# Patient Record
Sex: FEMALE | Race: WHITE | Employment: UNEMPLOYED | URBAN - METROPOLITAN AREA
[De-identification: names, ages, dates, MRNs, and addresses within clinical notes are randomized per-mention and may not be internally consistent; named-entity substitution may affect disease eponyms.]

---

## 2017-03-14 ENCOUNTER — TELEPHONE (OUTPATIENT)
Dept: OBGYN CLINIC | Facility: CLINIC | Age: 32
End: 2017-03-14

## 2017-03-14 NOTE — TELEPHONE ENCOUNTER
Authorization for Release of health-related information received via fax and placed on [employer] clipboard for processing.

## 2017-10-04 ENCOUNTER — TELEPHONE (OUTPATIENT)
Dept: OBGYN CLINIC | Facility: CLINIC | Age: 32
End: 2017-10-04

## 2017-10-04 NOTE — TELEPHONE ENCOUNTER
Reviewed message with PHOENIX in office who stated that pt needs to contact office if has no periods for 3 months AFTER she stops breastfeeding. Pt informed and verbalized understanding.  Pt informed that PHOENIX stated he may consider doing some labs if does not g

## 2017-10-04 NOTE — TELEPHONE ENCOUNTER
Pt calling to report that she delivered with our practice in September 2016 and is just now trying to wean her baby from breastfeeding.  Pt stated that when baby was around 11 months, she started weaning breastfeeding but states that she continues to breast

## 2017-10-09 ENCOUNTER — TELEPHONE (OUTPATIENT)
Dept: OBGYN CLINIC | Facility: CLINIC | Age: 32
End: 2017-10-09

## 2017-10-09 DIAGNOSIS — Z32.00 PREGNANCY EXAMINATION OR TEST, PREGNANCY UNCONFIRMED: Primary | ICD-10-CM

## 2017-10-09 NOTE — TELEPHONE ENCOUNTER
Pt calling to report that she called last week (see 10/4 comm) with complaints that she was currently breastfeeding but also using ovulation sticks because she wanted to see when her and her  could try to have another baby. Pt stated that she had several ovulation sticks that showed she was ovulating for about 8 days in a row. Pt stated she read online that if ovulation sticks show multiple days with a positive result it could be a sign of pregnancy. Pt stated that she took a HPT and it was positive. Pt stated that since early September she started weaning with breastfeeding and only  her infant \"for about 5 minutes before bed\". Pt stated that about 10 days she \"stopped breastfeeding completely\". Pt has not had any periods since her last delivery (9/7/16) and is unsure how far along she is. Pt informed that message will be sent to CAP (on call) to see if we can do blood work or [address]. Pt verbalized understanding.

## 2017-10-10 ENCOUNTER — LAB ENCOUNTER (OUTPATIENT)
Dept: LAB | Facility: HOSPITAL | Age: 32
End: 2017-10-10
Attending: OBSTETRICS & GYNECOLOGY
Payer: COMMERCIAL

## 2017-10-10 ENCOUNTER — TELEPHONE (OUTPATIENT)
Dept: OBGYN CLINIC | Facility: CLINIC | Age: 32
End: 2017-10-10

## 2017-10-10 DIAGNOSIS — Z32.00 PREGNANCY EXAMINATION OR TEST, PREGNANCY UNCONFIRMED: Primary | ICD-10-CM

## 2017-10-10 DIAGNOSIS — Z34.90 PREGNANCY, UNSPECIFIED GESTATIONAL AGE: Primary | ICD-10-CM

## 2017-10-10 PROCEDURE — 36415 COLL VENOUS BLD VENIPUNCTURE: CPT

## 2017-10-10 PROCEDURE — 84702 CHORIONIC GONADOTROPIN TEST: CPT

## 2017-10-10 NOTE — TELEPHONE ENCOUNTER
Spoke with CAP (on call) and verbal order given for pt to have quant drawn and if 2,500 or above then can do US to check for dating. Pt informed and verbalized understanding. Pt will go for quant tomorrow and call for results.

## 2017-10-10 NOTE — TELEPHONE ENCOUNTER
Pt given quant result and advised to schedule US for dates. Pt given the number for central scheduling. Pt advised to call back for results of US and to schedule OBN PC appt. Pt verbalizes understanding.

## 2017-10-17 ENCOUNTER — HOSPITAL ENCOUNTER (OUTPATIENT)
Dept: ULTRASOUND IMAGING | Facility: HOSPITAL | Age: 32
Discharge: HOME | End: 2017-10-17
Attending: OBSTETRICS & GYNECOLOGY
Payer: COMMERCIAL

## 2017-10-17 DIAGNOSIS — Z34.90 PREGNANCY, UNSPECIFIED GESTATIONAL AGE: ICD-10-CM

## 2017-10-17 PROCEDURE — 76801 OB US < 14 WKS SINGLE FETUS: CPT | Performed by: OBSTETRICS & GYNECOLOGY

## 2017-10-17 PROCEDURE — 76817 TRANSVAGINAL US OBSTETRIC: CPT | Performed by: OBSTETRICS & GYNECOLOGY

## 2017-10-18 ENCOUNTER — TELEPHONE (OUTPATIENT)
Dept: OBGYN CLINIC | Facility: CLINIC | Age: 32
End: 2017-10-18

## 2017-10-18 NOTE — TELEPHONE ENCOUNTER
Once pt decides which practice she is going to & starts care, recommend recheck FHT in office visit after OB nurse intake history done with that practice

## 2017-10-18 NOTE — TELEPHONE ENCOUNTER
Can schedule same day appt for Bedside US in office with a doctor after nurse visit. This will not be her OB MD visit however. She had a [address] that is common. Needs to notify US of bleeding.

## 2017-10-18 NOTE — TELEPHONE ENCOUNTER
Pt states that she has decided to stay with our group. Pt scheduled an OBN PC appt. Pt had been informed of [facility]'s recs below. Pt wants to know since she is doing an OBN PC appt should she come in and see OB MD appt for an OB U/S before her OBN PC.   Pt w

## 2017-10-18 NOTE — TELEPHONE ENCOUNTER
Pt has OBN PC 11/2 and scheduled a bedside US with Anant Luke the following day 11/3. Pt to call with VB or spotting. Pt verbalized understanding.

## 2017-10-18 NOTE — TELEPHONE ENCOUNTER
Pt informed that OB US showed that pt is about 5w5d as of yesterday with an JESSICA of 6/14/18. Pt verbalized understanding. Pt delivered with our practice in 2016, but has been breastfeeding and was unsure of dating since she has not had a period.  Pt informe

## 2017-10-19 ENCOUNTER — TELEPHONE (OUTPATIENT)
Dept: OBGYN CLINIC | Facility: CLINIC | Age: 32
End: 2017-10-19

## 2017-10-19 NOTE — TELEPHONE ENCOUNTER
Pt has a lot of questions about her US--mostly subchorionic hemorrhage that was noted. Pt informed that subchorionic hemorrhage usually resolves on its own sometime during the 1st/2nd trimester and is not uncommon.  Pt asking if its okay to wait 2 more week

## 2017-10-19 NOTE — TELEPHONE ENCOUNTER
[facility] does not require repeat US for resolution. She will be sent for US if concerns for bleeding occur.

## 2017-10-19 NOTE — TELEPHONE ENCOUNTER
Per pt would like to speak to a nurse. Wants more information on the hemorrhaging that was found on the US she had this week. Wants to know if she needs to be seen sooner than the 2 weeks that she was told. And if the hemorrhaging was her or the baby.  Want

## 2017-11-02 ENCOUNTER — NURSE ONLY (OUTPATIENT)
Dept: OBGYN CLINIC | Facility: CLINIC | Age: 32
End: 2017-11-02

## 2017-11-02 VITALS — HEIGHT: 66.5 IN | BODY MASS INDEX: 20.96 KG/M2 | WEIGHT: 132 LBS

## 2017-11-02 DIAGNOSIS — Z3A.08 8 WEEKS GESTATION OF PREGNANCY: Primary | ICD-10-CM

## 2017-11-02 RX ORDER — DOCUSATE SODIUM 100 MG/1
100 CAPSULE, LIQUID FILLED ORAL 2 TIMES DAILY
COMMUNITY
End: 2021-07-20

## 2017-11-02 NOTE — PROGRESS NOTES
Pt seen for OBN PC  appt today with no complaints. Normal PN labs ordered. Pt advised all labs must be completed and resulted prior to MD appt. Pt  Transferred to [facility] to make an appt with NPN appt with MD.       Pt has a hcg in the computer.   Pt has an Muscular Dystrophy No    Neural tube defects No    Sickle Cell Disease or trait No    Devon-Sachs Disease No    Thalassemia No    Other inherited genetic or chromosomal disorders No    Patient or baby's father had a child with birth defects not listed abov

## 2017-11-03 ENCOUNTER — LAB ENCOUNTER (OUTPATIENT)
Dept: LAB | Facility: HOSPITAL | Age: 32
End: 2017-11-03
Attending: OBSTETRICS & GYNECOLOGY
Payer: COMMERCIAL

## 2017-11-03 ENCOUNTER — TELEPHONE (OUTPATIENT)
Dept: OBGYN CLINIC | Facility: CLINIC | Age: 32
End: 2017-11-03

## 2017-11-03 DIAGNOSIS — Z3A.08 8 WEEKS GESTATION OF PREGNANCY: Primary | ICD-10-CM

## 2017-11-03 PROCEDURE — 82950 GLUCOSE TEST: CPT

## 2017-11-03 PROCEDURE — 85025 COMPLETE CBC W/AUTO DIFF WBC: CPT

## 2017-11-03 PROCEDURE — 86780 TREPONEMA PALLIDUM: CPT

## 2017-11-03 PROCEDURE — 36415 COLL VENOUS BLD VENIPUNCTURE: CPT

## 2017-11-03 PROCEDURE — 86901 BLOOD TYPING SEROLOGIC RH(D): CPT

## 2017-11-03 PROCEDURE — 87389 HIV-1 AG W/HIV-1&-2 AB AG IA: CPT

## 2017-11-03 PROCEDURE — 87340 HEPATITIS B SURFACE AG IA: CPT

## 2017-11-03 PROCEDURE — 86850 RBC ANTIBODY SCREEN: CPT

## 2017-11-03 PROCEDURE — 87086 URINE CULTURE/COLONY COUNT: CPT

## 2017-11-03 PROCEDURE — 86900 BLOOD TYPING SEROLOGIC ABO: CPT

## 2017-11-03 PROCEDURE — 86762 RUBELLA ANTIBODY: CPT

## 2017-11-03 NOTE — TELEPHONE ENCOUNTER
Pt [facility] after seeing a [address] on her OB US a few weeks ago. Pt states she has not had an VB or pain, feels well besides being nauseous.  Informed pt she does not need to come in today since she had an US showing a viabl

## 2017-11-04 NOTE — TELEPHONE ENCOUNTER
Informed pt of normal PN lab results. Also informed her HIV, Hep B and urine cx are in process and should be available by [facility] afternoon. Informed pt she can call us back for these results or the doctor will review them during her first PN appt on Friday.  MANJULA

## 2017-11-10 ENCOUNTER — INITIAL PRENATAL (OUTPATIENT)
Dept: OBGYN CLINIC | Facility: CLINIC | Age: 32
End: 2017-11-10

## 2017-11-10 VITALS
HEART RATE: 89 BPM | DIASTOLIC BLOOD PRESSURE: 78 MMHG | WEIGHT: 133 LBS | BODY MASS INDEX: 21 KG/M2 | SYSTOLIC BLOOD PRESSURE: 119 MMHG

## 2017-11-10 DIAGNOSIS — Z34.91 ENCOUNTER FOR SUPERVISION OF NORMAL PREGNANCY IN FIRST TRIMESTER, UNSPECIFIED GRAVIDITY: Primary | ICD-10-CM

## 2017-11-13 PROBLEM — N94.10 DYSPAREUNIA IN FEMALE: Status: ACTIVE | Noted: 2017-11-13

## 2017-11-13 PROBLEM — G89.29 CHRONIC PERINEAL PAIN IN FEMALE: Status: ACTIVE | Noted: 2017-11-13

## 2017-11-13 PROBLEM — O09.529 ANTEPARTUM MULTIGRAVIDA OF ADVANCED MATERNAL AGE (HCC): Status: ACTIVE | Noted: 2017-11-13

## 2017-11-13 PROBLEM — R10.2 CHRONIC PERINEAL PAIN IN FEMALE: Status: ACTIVE | Noted: 2017-11-13

## 2017-11-13 PROBLEM — O09.529 ANTEPARTUM MULTIGRAVIDA OF ADVANCED MATERNAL AGE: Status: ACTIVE | Noted: 2017-11-13

## 2017-11-13 NOTE — PROGRESS NOTES
Daily nausea w/o emesis. Discussed AMA and she declined genetics / Level 2. Only complaint is persistent perineal pain post repair of partial 3rd degree perineal laceration.  We will refer to pelvic floor PT eval and treatment in anticipation of vaginal bi

## 2017-11-17 ENCOUNTER — OFFICE VISIT (OUTPATIENT)
Dept: OBGYN CLINIC | Facility: CLINIC | Age: 32
End: 2017-11-17

## 2017-11-17 DIAGNOSIS — IMO0002 MEET AND GREET FOR EXPECTANT MOTHER: Primary | ICD-10-CM

## 2017-11-17 NOTE — PROGRESS NOTES
Here for meet & greet. Currently with 2nd floor & had her first baby with them, had 3rd degree, long labor with epidural. CNM care/philosophies discussed. Appropriate for CNM care. If desires to make prenatal visit with CNM as have records in system.

## 2017-12-08 ENCOUNTER — ROUTINE PRENATAL (OUTPATIENT)
Dept: OBGYN CLINIC | Facility: CLINIC | Age: 32
End: 2017-12-08

## 2017-12-08 VITALS
HEART RATE: 81 BPM | WEIGHT: 137.63 LBS | SYSTOLIC BLOOD PRESSURE: 109 MMHG | DIASTOLIC BLOOD PRESSURE: 73 MMHG | BODY MASS INDEX: 22 KG/M2

## 2017-12-08 DIAGNOSIS — Z34.81 ENCOUNTER FOR SUPERVISION OF OTHER NORMAL PREGNANCY IN FIRST TRIMESTER: Primary | ICD-10-CM

## 2018-01-05 ENCOUNTER — ROUTINE PRENATAL (OUTPATIENT)
Dept: OBGYN CLINIC | Facility: CLINIC | Age: 33
End: 2018-01-05

## 2018-01-05 VITALS
HEART RATE: 76 BPM | SYSTOLIC BLOOD PRESSURE: 99 MMHG | BODY MASS INDEX: 23 KG/M2 | WEIGHT: 142 LBS | DIASTOLIC BLOOD PRESSURE: 62 MMHG

## 2018-01-05 DIAGNOSIS — Z34.92 ENCOUNTER FOR SUPERVISION OF NORMAL PREGNANCY IN SECOND TRIMESTER, UNSPECIFIED GRAVIDITY: Primary | ICD-10-CM

## 2018-01-05 LAB
MULTISTIX LOT#: NORMAL NUMERIC
PH, URINE: 5 (ref 4.5–8)
SPECIFIC GRAVITY: 1.01 (ref 1–1.03)

## 2018-02-01 ENCOUNTER — HOSPITAL ENCOUNTER (OUTPATIENT)
Dept: ULTRASOUND IMAGING | Facility: HOSPITAL | Age: 33
Discharge: HOME | End: 2018-02-01
Attending: OBSTETRICS & GYNECOLOGY
Payer: COMMERCIAL

## 2018-02-01 DIAGNOSIS — Z34.92 ENCOUNTER FOR SUPERVISION OF NORMAL PREGNANCY IN SECOND TRIMESTER, UNSPECIFIED GRAVIDITY: ICD-10-CM

## 2018-02-01 PROCEDURE — 76805 OB US >/= 14 WKS SNGL FETUS: CPT | Performed by: OBSTETRICS & GYNECOLOGY

## 2018-02-04 ENCOUNTER — TELEPHONE (OUTPATIENT)
Dept: OBGYN CLINIC | Facility: CLINIC | Age: 33
End: 2018-02-04

## 2018-02-04 NOTE — TELEPHONE ENCOUNTER
21 weeks with ongoing painful hemorrhoid. Hurts to sit. Trying epson salt baths and prep H w/o relief. Feels a grape size cystic mass on rectum. Discussed likely thrombosed hemorrhoid. Recommend rest, continue hot baths, prep H, and stool softners.   Claryce Forth

## 2018-02-05 ENCOUNTER — ROUTINE PRENATAL (OUTPATIENT)
Dept: OBGYN CLINIC | Facility: CLINIC | Age: 33
End: 2018-02-05

## 2018-02-05 VITALS
WEIGHT: 150.38 LBS | SYSTOLIC BLOOD PRESSURE: 114 MMHG | HEART RATE: 85 BPM | DIASTOLIC BLOOD PRESSURE: 68 MMHG | BODY MASS INDEX: 24 KG/M2

## 2018-02-05 DIAGNOSIS — Z34.92 ENCOUNTER FOR SUPERVISION OF NORMAL PREGNANCY IN SECOND TRIMESTER, UNSPECIFIED GRAVIDITY: Primary | ICD-10-CM

## 2018-02-05 PROCEDURE — 99213 OFFICE O/P EST LOW 20 MIN: CPT | Performed by: OBSTETRICS & GYNECOLOGY

## 2018-02-06 ENCOUNTER — OFFICE VISIT (OUTPATIENT)
Dept: SURGERY | Facility: CLINIC | Age: 33
End: 2018-02-06

## 2018-02-06 ENCOUNTER — TELEPHONE (OUTPATIENT)
Dept: PEDIATRICS CLINIC | Facility: CLINIC | Age: 33
End: 2018-02-06

## 2018-02-06 VITALS
HEIGHT: 66 IN | HEART RATE: 62 BPM | SYSTOLIC BLOOD PRESSURE: 110 MMHG | WEIGHT: 150 LBS | DIASTOLIC BLOOD PRESSURE: 72 MMHG | BODY MASS INDEX: 24.11 KG/M2

## 2018-02-06 DIAGNOSIS — K64.5 THROMBOSED EXTERNAL HEMORRHOID: Primary | ICD-10-CM

## 2018-02-06 DIAGNOSIS — K64.8 PROLAPSED INTERNAL HEMORRHOIDS: ICD-10-CM

## 2018-02-06 PROBLEM — O22.42 HEMORRHOIDS DURING PREGNANCY IN SECOND TRIMESTER (HCC): Status: ACTIVE | Noted: 2018-02-06

## 2018-02-06 PROBLEM — O22.42 HEMORRHOIDS DURING PREGNANCY IN SECOND TRIMESTER: Status: ACTIVE | Noted: 2018-02-06

## 2018-02-06 PROCEDURE — 99244 OFF/OP CNSLTJ NEW/EST MOD 40: CPT | Performed by: SURGERY

## 2018-02-06 PROCEDURE — 99212 OFFICE O/P EST SF 10 MIN: CPT | Performed by: SURGERY

## 2018-02-06 PROCEDURE — 46083 INC THROMBOSED HROID XTRNL: CPT | Performed by: SURGERY

## 2018-02-06 NOTE — TELEPHONE ENCOUNTER
Talked to Dr. Shelia Solano nurse Mariya Fall. She stated that doctor could see pt today at 3:00 PM.  Gave pt directions to see doctor. Sent to PHOENIX as a FYI, that ptis going to see doctor.

## 2018-02-06 NOTE — TELEPHONE ENCOUNTER
PT HAS MIGUEL SEEN  YESTERDAY AND SAID HE TO TAKE STEROIDS AND SEE IF THAT WORKS PT CANT WALK ITS GETTING WORSE ASKING TO SPEAK TO NURSE TO SEE WHERE TO GO TO GET TI REMOVED

## 2018-02-06 NOTE — TELEPHONE ENCOUNTER
21w5d.  Pt's states that her hemorrhoids are worse. She rates her pain 10/10. She states that PHOENIX gave her a rx for [facility]. and she states it is not helping. Denies bleeding. She states with  her external hemorrhoids, it makes it hard to stand up and move. Pt has an appt with Gayle Lee on 2/9/18, but feels that she can not wait until that appt.  Paged PHOENIX.

## 2018-02-06 NOTE — TELEPHONE ENCOUNTER
Discussed with PHOENIX the below. He stated that pt needs to see general surgeon today if possible or go to the ED.       Called General Surgeon' office and they stated that pt could

## 2018-02-06 NOTE — TELEPHONE ENCOUNTER
Pt rt call, asking if she needs ok from physicians to make an appt for hemorrhoid to be removed? States steroids are not helping, cannot stand, walk. pls adv.

## 2018-02-07 NOTE — PROGRESS NOTES
History and Physical      Bernie Born is a 26year old female. HPI   Patient presents with:  Hemorrhoids: Patient referred by OB Dr. Flo Healy for hemorrhoids. 21 weeks pregnant.  Seen by Dr. Flo Healy yesterday 02/05 and was given Analpram HC with no re Marketing                               maternity leave    Social History Main Topics    Smoking status: Never Smoker                                                                Smokeless tobacco: Never Used or mass, nl tone.        DIAGNOSTICS      ASSESSMENT/PLAN   Assessment   Thrombosed external hemorrhoid  (primary encounter diagnosis)  Prolapsed internal hemorrhoids    27 y/o woman with probably subacute hemorrhoid disease - prolapsing internal polypoid-l

## 2018-02-07 NOTE — PROGRESS NOTES
Problem visit: Worsening hemorrhoid swelling and pain. Used OTC cream w/o relief. No blood. Exam shows single 1 cm inflamed hemorrhoid w/o thrombosis at 12-1:00 position. Begin Analpram HC 2.5% and guidance given. Discussed general surgeon if no help.

## 2018-02-09 ENCOUNTER — ROUTINE PRENATAL (OUTPATIENT)
Dept: OBGYN CLINIC | Facility: CLINIC | Age: 33
End: 2018-02-09

## 2018-02-09 VITALS
BODY MASS INDEX: 24 KG/M2 | SYSTOLIC BLOOD PRESSURE: 113 MMHG | HEART RATE: 86 BPM | WEIGHT: 149 LBS | DIASTOLIC BLOOD PRESSURE: 78 MMHG

## 2018-02-09 DIAGNOSIS — Z34.82 ENCOUNTER FOR SUPERVISION OF OTHER NORMAL PREGNANCY IN SECOND TRIMESTER: Primary | ICD-10-CM

## 2018-02-09 LAB
APPEARANCE: CLEAR
MULTISTIX LOT#: NORMAL NUMERIC

## 2018-02-09 PROCEDURE — 81002 URINALYSIS NONAUTO W/O SCOPE: CPT | Performed by: OBSTETRICS & GYNECOLOGY

## 2018-02-09 NOTE — PROGRESS NOTES
Hemorrhoid is improving and no longer in pain. Pt concerned that she is not taking PNV at this time to help with constipation. Reviewed that it is ok for next few weeks to help with constipation and hemorrhoid pain.   RTC 4 wks

## 2018-03-08 ENCOUNTER — ROUTINE PRENATAL (OUTPATIENT)
Dept: OBGYN CLINIC | Facility: CLINIC | Age: 33
End: 2018-03-08

## 2018-03-08 VITALS
HEART RATE: 76 BPM | BODY MASS INDEX: 25 KG/M2 | DIASTOLIC BLOOD PRESSURE: 66 MMHG | SYSTOLIC BLOOD PRESSURE: 104 MMHG | WEIGHT: 156.19 LBS

## 2018-03-08 DIAGNOSIS — Z34.92 ENCOUNTER FOR SUPERVISION OF NORMAL PREGNANCY IN SECOND TRIMESTER, UNSPECIFIED GRAVIDITY: Primary | ICD-10-CM

## 2018-03-08 LAB
APPEARANCE: CLEAR
MULTISTIX LOT#: NORMAL NUMERIC
PH, URINE: 7.5 (ref 4.5–8)
SPECIFIC GRAVITY: 1 (ref 1–1.03)
URINE-COLOR: YELLOW
UROBILINOGEN,SEMI-QN: 0.2 MG/DL (ref 0–1.9)

## 2018-03-08 PROCEDURE — 81002 URINALYSIS NONAUTO W/O SCOPE: CPT | Performed by: OBSTETRICS & GYNECOLOGY

## 2018-03-21 ENCOUNTER — APPOINTMENT (OUTPATIENT)
Dept: LAB | Age: 33
End: 2018-03-21
Attending: OBSTETRICS & GYNECOLOGY
Payer: COMMERCIAL

## 2018-03-21 DIAGNOSIS — Z34.92 ENCOUNTER FOR SUPERVISION OF NORMAL PREGNANCY IN SECOND TRIMESTER, UNSPECIFIED GRAVIDITY: ICD-10-CM

## 2018-03-21 LAB
ERYTHROCYTE [DISTWIDTH] IN BLOOD BY AUTOMATED COUNT: 13.1 % (ref 11–15)
GLUCOSE 1H P 50 G GLC PO SERPL-MCNC: 124 MG/DL
HCT VFR BLD AUTO: 35.7 % (ref 35–48)
HGB BLD-MCNC: 12.5 G/DL (ref 12–16)
MCH RBC QN AUTO: 31.4 PG (ref 27–32)
MCHC RBC AUTO-ENTMCNC: 34.9 G/DL (ref 32–37)
MCV RBC AUTO: 89.9 FL (ref 80–100)
PLATELET # BLD AUTO: 235 K/UL (ref 140–400)
PMV BLD AUTO: 8.9 FL (ref 7.4–10.3)
RBC # BLD AUTO: 3.96 M/UL (ref 3.7–5.4)
WBC # BLD AUTO: 9 K/UL (ref 4–11)

## 2018-03-21 PROCEDURE — 82950 GLUCOSE TEST: CPT

## 2018-03-21 PROCEDURE — 85027 COMPLETE CBC AUTOMATED: CPT

## 2018-03-21 PROCEDURE — 36415 COLL VENOUS BLD VENIPUNCTURE: CPT

## 2018-03-26 ENCOUNTER — ROUTINE PRENATAL (OUTPATIENT)
Dept: OBGYN CLINIC | Facility: CLINIC | Age: 33
End: 2018-03-26

## 2018-03-26 VITALS
WEIGHT: 159.81 LBS | DIASTOLIC BLOOD PRESSURE: 71 MMHG | BODY MASS INDEX: 26 KG/M2 | HEART RATE: 87 BPM | SYSTOLIC BLOOD PRESSURE: 109 MMHG

## 2018-03-26 DIAGNOSIS — Z34.83 ENCOUNTER FOR SUPERVISION OF OTHER NORMAL PREGNANCY IN THIRD TRIMESTER: Primary | ICD-10-CM

## 2018-03-26 LAB
APPEARANCE: CLEAR
MULTISTIX LOT#: NORMAL NUMERIC
PH, URINE: 5.5 (ref 4.5–8)
SPECIFIC GRAVITY: 1.01 (ref 1–1.03)
URINE-COLOR: YELLOW
UROBILINOGEN,SEMI-QN: 0.2 MG/DL (ref 0–1.9)

## 2018-03-26 PROCEDURE — 81002 URINALYSIS NONAUTO W/O SCOPE: CPT | Performed by: OBSTETRICS & GYNECOLOGY

## 2018-03-26 NOTE — PROGRESS NOTES
No issues. Has stopped PNV due to hemorrhoids and constiaption. Eating well balanced diet however. RTC 2 wks.

## 2018-04-06 ENCOUNTER — TELEPHONE (OUTPATIENT)
Dept: OBGYN CLINIC | Facility: CLINIC | Age: 33
End: 2018-04-06

## 2018-04-06 ENCOUNTER — ROUTINE PRENATAL (OUTPATIENT)
Dept: OBGYN CLINIC | Facility: CLINIC | Age: 33
End: 2018-04-06

## 2018-04-06 VITALS
DIASTOLIC BLOOD PRESSURE: 73 MMHG | WEIGHT: 160 LBS | HEART RATE: 79 BPM | SYSTOLIC BLOOD PRESSURE: 118 MMHG | BODY MASS INDEX: 26 KG/M2

## 2018-04-06 DIAGNOSIS — O09.523 ELDERLY MULTIGRAVIDA IN THIRD TRIMESTER: ICD-10-CM

## 2018-04-06 DIAGNOSIS — Z34.93 ENCOUNTER FOR SUPERVISION OF NORMAL PREGNANCY IN THIRD TRIMESTER, UNSPECIFIED GRAVIDITY: Primary | ICD-10-CM

## 2018-04-06 PROCEDURE — 81002 URINALYSIS NONAUTO W/O SCOPE: CPT | Performed by: OBSTETRICS & GYNECOLOGY

## 2018-04-06 NOTE — TELEPHONE ENCOUNTER
Pt was seen by PHOENIX today for PN appt. Message to PHOENIX--does pt need US ordered to be completed at 32 weeks?

## 2018-04-06 NOTE — TELEPHONE ENCOUNTER
LIONEL, CALLING FROM [address] [place] [address] OUT PT SCHEDULING / REQUESTING AN ORDER FOR PREGNANCY US / PLS ADV

## 2018-04-07 ENCOUNTER — TELEPHONE (OUTPATIENT)
Dept: OBGYN CLINIC | Facility: CLINIC | Age: 33
End: 2018-04-07

## 2018-04-17 ENCOUNTER — TELEPHONE (OUTPATIENT)
Dept: OBGYN CLINIC | Facility: CLINIC | Age: 33
End: 2018-04-17

## 2018-04-17 ENCOUNTER — HOSPITAL ENCOUNTER (OUTPATIENT)
Dept: ULTRASOUND IMAGING | Facility: HOSPITAL | Age: 33
Discharge: HOME | End: 2018-04-17
Attending: OBSTETRICS & GYNECOLOGY
Payer: COMMERCIAL

## 2018-04-17 DIAGNOSIS — O09.523 ELDERLY MULTIGRAVIDA IN THIRD TRIMESTER: ICD-10-CM

## 2018-04-17 PROCEDURE — 76816 OB US FOLLOW-UP PER FETUS: CPT | Performed by: OBSTETRICS & GYNECOLOGY

## 2018-04-18 NOTE — TELEPHONE ENCOUNTER
Paged on call by Paola William [phone] with report suggesting funneling of cervix at 31+ weeks. Closed length is normal at 4.8 cm but radiology impression is total length of 8.3 cm with significant widening above the closed segment. I spoke with [facility] over phone.  No regu

## 2018-04-20 ENCOUNTER — ROUTINE PRENATAL (OUTPATIENT)
Dept: OBGYN CLINIC | Facility: CLINIC | Age: 33
End: 2018-04-20

## 2018-04-20 VITALS
WEIGHT: 164 LBS | SYSTOLIC BLOOD PRESSURE: 110 MMHG | BODY MASS INDEX: 26 KG/M2 | DIASTOLIC BLOOD PRESSURE: 76 MMHG | HEART RATE: 80 BPM

## 2018-04-20 DIAGNOSIS — Z34.83 ENCOUNTER FOR SUPERVISION OF OTHER NORMAL PREGNANCY IN THIRD TRIMESTER: Primary | ICD-10-CM

## 2018-04-20 PROCEDURE — 81002 URINALYSIS NONAUTO W/O SCOPE: CPT | Performed by: OBSTETRICS & GYNECOLOGY

## 2018-04-29 PROBLEM — Z34.90 PRENATAL CARE, ANTEPARTUM: Status: ACTIVE | Noted: 2018-04-29

## 2018-04-29 PROBLEM — Z34.90 PRENATAL CARE, ANTEPARTUM (HCC): Status: ACTIVE | Noted: 2018-04-29

## 2018-04-30 NOTE — PROGRESS NOTES
47%. No S/S of PTL. We discussed group opinion on US result showing funneling. No further f/u. PTL instructions only.

## 2018-05-04 ENCOUNTER — ROUTINE PRENATAL (OUTPATIENT)
Dept: OBGYN CLINIC | Facility: CLINIC | Age: 33
End: 2018-05-04

## 2018-05-04 ENCOUNTER — APPOINTMENT (OUTPATIENT)
Dept: LAB | Facility: HOSPITAL | Age: 33
End: 2018-05-04
Attending: OBSTETRICS & GYNECOLOGY
Payer: COMMERCIAL

## 2018-05-04 VITALS
BODY MASS INDEX: 27 KG/M2 | WEIGHT: 165 LBS | HEART RATE: 82 BPM | DIASTOLIC BLOOD PRESSURE: 69 MMHG | SYSTOLIC BLOOD PRESSURE: 116 MMHG

## 2018-05-04 DIAGNOSIS — Z34.93 ENCOUNTER FOR SUPERVISION OF NORMAL PREGNANCY IN THIRD TRIMESTER, UNSPECIFIED GRAVIDITY: Primary | ICD-10-CM

## 2018-05-04 DIAGNOSIS — O09.523 AMA (ADVANCED MATERNAL AGE) MULTIGRAVIDA 35+, THIRD TRIMESTER: ICD-10-CM

## 2018-05-04 DIAGNOSIS — Z34.83 ENCOUNTER FOR SUPERVISION OF OTHER NORMAL PREGNANCY IN THIRD TRIMESTER: ICD-10-CM

## 2018-05-04 PROCEDURE — 87389 HIV-1 AG W/HIV-1&-2 AB AG IA: CPT

## 2018-05-04 PROCEDURE — 36415 COLL VENOUS BLD VENIPUNCTURE: CPT

## 2018-05-04 PROCEDURE — 86780 TREPONEMA PALLIDUM: CPT

## 2018-05-04 PROCEDURE — 85027 COMPLETE CBC AUTOMATED: CPT

## 2018-05-04 PROCEDURE — 81002 URINALYSIS NONAUTO W/O SCOPE: CPT | Performed by: OBSTETRICS & GYNECOLOGY

## 2018-05-16 ENCOUNTER — HOSPITAL ENCOUNTER (OUTPATIENT)
Facility: HOSPITAL | Age: 33
Discharge: HOME | End: 2018-05-16
Attending: OBSTETRICS & GYNECOLOGY | Admitting: OBSTETRICS & GYNECOLOGY
Payer: COMMERCIAL

## 2018-05-16 ENCOUNTER — APPOINTMENT (OUTPATIENT)
Dept: OBGYN CLINIC | Facility: HOSPITAL | Age: 33
End: 2018-05-16
Payer: COMMERCIAL

## 2018-05-16 ENCOUNTER — ROUTINE PRENATAL (OUTPATIENT)
Dept: OBGYN CLINIC | Facility: CLINIC | Age: 33
End: 2018-05-16

## 2018-05-16 VITALS — HEART RATE: 78 BPM | RESPIRATION RATE: 18 BRPM | SYSTOLIC BLOOD PRESSURE: 113 MMHG | DIASTOLIC BLOOD PRESSURE: 67 MMHG

## 2018-05-16 VITALS
HEART RATE: 76 BPM | SYSTOLIC BLOOD PRESSURE: 111 MMHG | DIASTOLIC BLOOD PRESSURE: 73 MMHG | WEIGHT: 165 LBS | BODY MASS INDEX: 27 KG/M2

## 2018-05-16 DIAGNOSIS — Z34.83 ENCOUNTER FOR SUPERVISION OF OTHER NORMAL PREGNANCY IN THIRD TRIMESTER: Primary | ICD-10-CM

## 2018-05-16 PROCEDURE — 81002 URINALYSIS NONAUTO W/O SCOPE: CPT | Performed by: OBSTETRICS & GYNECOLOGY

## 2018-05-16 PROCEDURE — 59025 FETAL NON-STRESS TEST: CPT | Performed by: OBSTETRICS & GYNECOLOGY

## 2018-05-17 NOTE — NST
Nonstress Test   Patient: Chetna Bryant    Gestation: 35w6d    NST: scheduled NST for AMA       Variability: Moderate           Accelerations: Yes           Decelerations: None            Baseline: 135 BPM           Uterine Irritability: No           Contr

## 2018-05-22 ENCOUNTER — APPOINTMENT (OUTPATIENT)
Dept: OBGYN CLINIC | Facility: HOSPITAL | Age: 33
End: 2018-05-22
Payer: COMMERCIAL

## 2018-05-22 ENCOUNTER — HOSPITAL ENCOUNTER (OUTPATIENT)
Facility: HOSPITAL | Age: 33
Setting detail: OBSERVATION
Discharge: HOME | End: 2018-05-22
Attending: OBSTETRICS & GYNECOLOGY | Admitting: OBSTETRICS & GYNECOLOGY
Payer: COMMERCIAL

## 2018-05-22 VITALS — SYSTOLIC BLOOD PRESSURE: 118 MMHG | HEART RATE: 72 BPM | DIASTOLIC BLOOD PRESSURE: 63 MMHG

## 2018-05-22 PROBLEM — Z34.90 PREGNANCY: Status: ACTIVE | Noted: 2018-05-22

## 2018-05-22 PROBLEM — Z34.90 PREGNANCY (HCC): Status: ACTIVE | Noted: 2018-05-22

## 2018-05-22 PROCEDURE — 59025 FETAL NON-STRESS TEST: CPT | Performed by: OBSTETRICS & GYNECOLOGY

## 2018-05-23 NOTE — NST
Nonstress Test   Patient: Cain Cooney    Gestation: 36w5d    NST:       Variability: Moderate           Accelerations: Yes           Decelerations: None            Baseline: 135 BPM           Uterine Irritability: No           Contractions: Not present

## 2018-05-25 ENCOUNTER — ROUTINE PRENATAL (OUTPATIENT)
Dept: OBGYN CLINIC | Facility: CLINIC | Age: 33
End: 2018-05-25

## 2018-05-25 VITALS
BODY MASS INDEX: 27 KG/M2 | DIASTOLIC BLOOD PRESSURE: 69 MMHG | HEART RATE: 94 BPM | WEIGHT: 166 LBS | SYSTOLIC BLOOD PRESSURE: 104 MMHG

## 2018-05-25 DIAGNOSIS — Z34.93 ENCOUNTER FOR SUPERVISION OF NORMAL PREGNANCY IN THIRD TRIMESTER, UNSPECIFIED GRAVIDITY: Primary | ICD-10-CM

## 2018-05-25 PROCEDURE — 81002 URINALYSIS NONAUTO W/O SCOPE: CPT | Performed by: OBSTETRICS & GYNECOLOGY

## 2018-05-29 ENCOUNTER — APPOINTMENT (OUTPATIENT)
Dept: OBGYN CLINIC | Facility: HOSPITAL | Age: 33
End: 2018-05-29
Payer: COMMERCIAL

## 2018-05-29 ENCOUNTER — HOSPITAL ENCOUNTER (OUTPATIENT)
Facility: HOSPITAL | Age: 33
Discharge: HOME | End: 2018-05-29
Attending: OBSTETRICS & GYNECOLOGY | Admitting: OBSTETRICS & GYNECOLOGY
Payer: COMMERCIAL

## 2018-05-29 VITALS — RESPIRATION RATE: 16 BRPM | DIASTOLIC BLOOD PRESSURE: 73 MMHG | HEART RATE: 75 BPM | SYSTOLIC BLOOD PRESSURE: 121 MMHG

## 2018-05-29 PROBLEM — O09.529 AMA (ADVANCED MATERNAL AGE) MULTIGRAVIDA 35+: Status: ACTIVE | Noted: 2018-05-29

## 2018-05-29 PROBLEM — O09.529 AMA (ADVANCED MATERNAL AGE) MULTIGRAVIDA 35+ (HCC): Status: ACTIVE | Noted: 2018-05-29

## 2018-05-29 PROCEDURE — 59025 FETAL NON-STRESS TEST: CPT | Performed by: OBSTETRICS & GYNECOLOGY

## 2018-05-30 NOTE — NST
Nonstress Test   Patient: Tee Alexandre    Gestation: 37w5d    NST:       Variability: Moderate           Accelerations: Yes           Decelerations: None            Baseline: 135 BPM           Uterine Irritability: No           Contractions: Not present

## 2018-06-01 ENCOUNTER — ROUTINE PRENATAL (OUTPATIENT)
Dept: OBGYN CLINIC | Facility: CLINIC | Age: 33
End: 2018-06-01

## 2018-06-01 VITALS
SYSTOLIC BLOOD PRESSURE: 121 MMHG | BODY MASS INDEX: 27 KG/M2 | HEART RATE: 87 BPM | DIASTOLIC BLOOD PRESSURE: 76 MMHG | WEIGHT: 168 LBS

## 2018-06-01 DIAGNOSIS — Z34.93 ENCOUNTER FOR SUPERVISION OF NORMAL PREGNANCY IN THIRD TRIMESTER, UNSPECIFIED GRAVIDITY: Primary | ICD-10-CM

## 2018-06-01 PROCEDURE — 81002 URINALYSIS NONAUTO W/O SCOPE: CPT | Performed by: OBSTETRICS & GYNECOLOGY

## 2018-06-04 ENCOUNTER — HOSPITAL ENCOUNTER (OUTPATIENT)
Facility: HOSPITAL | Age: 33
Discharge: HOME | End: 2018-06-04
Attending: OBSTETRICS & GYNECOLOGY | Admitting: OBSTETRICS & GYNECOLOGY
Payer: COMMERCIAL

## 2018-06-04 ENCOUNTER — APPOINTMENT (OUTPATIENT)
Dept: OBGYN CLINIC | Facility: HOSPITAL | Age: 33
End: 2018-06-04
Payer: COMMERCIAL

## 2018-06-04 VITALS
DIASTOLIC BLOOD PRESSURE: 72 MMHG | SYSTOLIC BLOOD PRESSURE: 114 MMHG | RESPIRATION RATE: 18 BRPM | TEMPERATURE: 98 F | HEART RATE: 76 BPM

## 2018-06-04 PROBLEM — Z36.89 NST (NON-STRESS TEST) REACTIVE (HCC): Status: ACTIVE | Noted: 2018-06-04

## 2018-06-04 PROBLEM — Z36.89 NST (NON-STRESS TEST) REACTIVE: Status: ACTIVE | Noted: 2018-06-04

## 2018-06-04 PROCEDURE — 59025 FETAL NON-STRESS TEST: CPT | Performed by: OBSTETRICS & GYNECOLOGY

## 2018-06-07 ENCOUNTER — ROUTINE PRENATAL (OUTPATIENT)
Dept: OBGYN CLINIC | Facility: CLINIC | Age: 33
End: 2018-06-07

## 2018-06-07 VITALS
DIASTOLIC BLOOD PRESSURE: 70 MMHG | BODY MASS INDEX: 27 KG/M2 | WEIGHT: 169.63 LBS | HEART RATE: 90 BPM | SYSTOLIC BLOOD PRESSURE: 104 MMHG

## 2018-06-07 DIAGNOSIS — Z3A.39 39 WEEKS GESTATION OF PREGNANCY: Primary | ICD-10-CM

## 2018-06-07 PROCEDURE — 81002 URINALYSIS NONAUTO W/O SCOPE: CPT | Performed by: OBSTETRICS & GYNECOLOGY

## 2018-06-11 ENCOUNTER — HOSPITAL ENCOUNTER (OUTPATIENT)
Facility: HOSPITAL | Age: 33
Discharge: HOME | End: 2018-06-11
Attending: OBSTETRICS & GYNECOLOGY | Admitting: OBSTETRICS & GYNECOLOGY
Payer: COMMERCIAL

## 2018-06-11 ENCOUNTER — APPOINTMENT (OUTPATIENT)
Dept: OBGYN CLINIC | Facility: HOSPITAL | Age: 33
End: 2018-06-11
Payer: COMMERCIAL

## 2018-06-11 VITALS — HEART RATE: 72 BPM | DIASTOLIC BLOOD PRESSURE: 67 MMHG | SYSTOLIC BLOOD PRESSURE: 103 MMHG

## 2018-06-11 PROCEDURE — 59025 FETAL NON-STRESS TEST: CPT | Performed by: OBSTETRICS & GYNECOLOGY

## 2018-06-12 NOTE — NST
Nonstress Test   Patient: Elvis Escobedo    Gestation: 39w4d    NST:       Variability: Moderate                       Decelerations: None            Baseline: 130 BPM           Uterine Irritability: No           Contractions: Not present

## 2018-06-14 ENCOUNTER — ROUTINE PRENATAL (OUTPATIENT)
Dept: OBGYN CLINIC | Facility: CLINIC | Age: 33
End: 2018-06-14

## 2018-06-14 VITALS
SYSTOLIC BLOOD PRESSURE: 105 MMHG | DIASTOLIC BLOOD PRESSURE: 71 MMHG | BODY MASS INDEX: 28 KG/M2 | WEIGHT: 172 LBS | HEART RATE: 88 BPM

## 2018-06-14 DIAGNOSIS — Z34.93 ENCOUNTER FOR SUPERVISION OF NORMAL PREGNANCY IN THIRD TRIMESTER, UNSPECIFIED GRAVIDITY: Primary | ICD-10-CM

## 2018-06-14 PROCEDURE — 81002 URINALYSIS NONAUTO W/O SCOPE: CPT | Performed by: OBSTETRICS & GYNECOLOGY

## 2018-06-15 ENCOUNTER — OFFICE VISIT (OUTPATIENT)
Dept: INTEGRATIVE MEDICINE | Facility: HOSPITAL | Age: 33
End: 2018-06-15
Attending: GENERAL ACUTE CARE HOSPITAL
Payer: COMMERCIAL

## 2018-06-15 DIAGNOSIS — O22.42 HEMORRHOIDS DURING PREGNANCY IN SECOND TRIMESTER: Primary | ICD-10-CM

## 2018-06-16 ENCOUNTER — HOSPITAL ENCOUNTER (OUTPATIENT)
Facility: HOSPITAL | Age: 33
Setting detail: OBSERVATION
Discharge: HOME | End: 2018-06-17
Attending: OBSTETRICS & GYNECOLOGY | Admitting: OBSTETRICS & GYNECOLOGY
Payer: COMMERCIAL

## 2018-06-16 ENCOUNTER — TELEPHONE (OUTPATIENT)
Dept: OBGYN CLINIC | Facility: CLINIC | Age: 33
End: 2018-06-16

## 2018-06-16 NOTE — TELEPHONE ENCOUNTER
Pt is 40w2d. Pt reports since 5am she has a mucosy red tinged discharge. Pt reports that she is experiencing random contractions about every 10-11 minutes for the past couple hours but they are not consistent. Pt reports +FM.  Pt encouraged to push fluids,

## 2018-06-16 NOTE — TELEPHONE ENCOUNTER
ON CALL-- spoke with pt.  40 wk pregnant. Has been solis q 10 min for another. Feels its too early to go to [facility]. VE closed at office. Will call back when ctx q 5-10 min.

## 2018-06-16 NOTE — TELEPHONE ENCOUNTER
NJG (on call) reviewed of message below. V/o received for pt to continue to monitor and to call us when she is having painful contractions every 10 minutes for 1 hour.    LMTCB

## 2018-06-16 NOTE — TELEPHONE ENCOUNTER
Pt given NJG's recs and instructed to call if she starts having vaginal bleeding as well. Pt states she is still having mucousy discharge that is tinted with brownish blood.   Pt reassured that this can be normal.  Pt states she hasn't had a contraction in

## 2018-06-17 ENCOUNTER — ANESTHESIA EVENT (OUTPATIENT)
Dept: OBGYN UNIT | Facility: HOSPITAL | Age: 33
End: 2018-06-17
Payer: COMMERCIAL

## 2018-06-17 ENCOUNTER — ANESTHESIA (OUTPATIENT)
Dept: OBGYN UNIT | Facility: HOSPITAL | Age: 33
End: 2018-06-17
Payer: COMMERCIAL

## 2018-06-17 ENCOUNTER — HOSPITAL ENCOUNTER (INPATIENT)
Facility: HOSPITAL | Age: 33
LOS: 2 days | Discharge: HOME | End: 2018-06-19
Attending: OBSTETRICS & GYNECOLOGY | Admitting: OBSTETRICS & GYNECOLOGY
Payer: COMMERCIAL

## 2018-06-17 VITALS
SYSTOLIC BLOOD PRESSURE: 108 MMHG | DIASTOLIC BLOOD PRESSURE: 79 MMHG | RESPIRATION RATE: 18 BRPM | TEMPERATURE: 98 F | HEART RATE: 102 BPM

## 2018-06-17 PROCEDURE — 10907ZC DRAINAGE OF AMNIOTIC FLUID, THERAPEUTIC FROM PRODUCTS OF CONCEPTION, VIA NATURAL OR ARTIFICIAL OPENING: ICD-10-PCS | Performed by: OBSTETRICS & GYNECOLOGY

## 2018-06-17 PROCEDURE — 59025 FETAL NON-STRESS TEST: CPT | Performed by: OBSTETRICS & GYNECOLOGY

## 2018-06-17 PROCEDURE — 59400 OBSTETRICAL CARE: CPT | Performed by: OBSTETRICS & GYNECOLOGY

## 2018-06-17 PROCEDURE — 10H07YZ INSERTION OF OTHER DEVICE INTO PRODUCTS OF CONCEPTION, VIA NATURAL OR ARTIFICIAL OPENING: ICD-10-PCS | Performed by: OBSTETRICS & GYNECOLOGY

## 2018-06-17 PROCEDURE — 0KQM0ZZ REPAIR PERINEUM MUSCLE, OPEN APPROACH: ICD-10-PCS | Performed by: OBSTETRICS & GYNECOLOGY

## 2018-06-17 RX ORDER — 0.9 % SODIUM CHLORIDE 0.9 %
VIAL (ML) INJECTION
Status: DISPENSED
Start: 2018-06-17 | End: 2018-06-18

## 2018-06-17 RX ORDER — LIDOCAINE HYDROCHLORIDE AND EPINEPHRINE 15; 5 MG/ML; UG/ML
INJECTION, SOLUTION EPIDURAL AS NEEDED
Status: DISCONTINUED | OUTPATIENT
Start: 2018-06-17 | End: 2018-06-17 | Stop reason: SURG

## 2018-06-17 RX ORDER — DOCUSATE SODIUM 100 MG/1
100 CAPSULE, LIQUID FILLED ORAL 2 TIMES DAILY
Status: DISCONTINUED | OUTPATIENT
Start: 2018-06-17 | End: 2018-06-19

## 2018-06-17 RX ORDER — SODIUM CHLORIDE 0.9 % (FLUSH) 0.9 %
10 SYRINGE (ML) INJECTION AS NEEDED
Status: DISCONTINUED | OUTPATIENT
Start: 2018-06-17 | End: 2018-06-19

## 2018-06-17 RX ORDER — AMMONIA INHALANTS 0.04 G/.3ML
0.3 INHALANT RESPIRATORY (INHALATION) AS NEEDED
Status: DISCONTINUED | OUTPATIENT
Start: 2018-06-17 | End: 2018-06-19

## 2018-06-17 RX ORDER — PRENATAL VIT,CAL 76/IRON/FOLIC 29 MG-1 MG
1 TABLET ORAL DAILY
Status: DISCONTINUED | OUTPATIENT
Start: 2018-06-17 | End: 2018-06-19

## 2018-06-17 RX ORDER — BUPIVACAINE HYDROCHLORIDE 2.5 MG/ML
INJECTION, SOLUTION EPIDURAL; INFILTRATION; INTRACAUDAL
Status: COMPLETED
Start: 2018-06-17 | End: 2018-06-17

## 2018-06-17 RX ORDER — LIDOCAINE HYDROCHLORIDE 10 MG/ML
30 INJECTION, SOLUTION EPIDURAL; INFILTRATION; INTRACAUDAL; PERINEURAL ONCE
Status: DISCONTINUED | OUTPATIENT
Start: 2018-06-17 | End: 2018-06-17

## 2018-06-17 RX ORDER — ACETAMINOPHEN 325 MG/1
650 TABLET ORAL EVERY 6 HOURS PRN
Status: DISCONTINUED | OUTPATIENT
Start: 2018-06-17 | End: 2018-06-19

## 2018-06-17 RX ORDER — TRISODIUM CITRATE DIHYDRATE AND CITRIC ACID MONOHYDRATE 500; 334 MG/5ML; MG/5ML
30 SOLUTION ORAL AS NEEDED
Status: DISCONTINUED | OUTPATIENT
Start: 2018-06-17 | End: 2018-06-17

## 2018-06-17 RX ORDER — EPHEDRINE SULFATE/0.9% NACL/PF 25 MG/5 ML
5 SYRINGE (ML) INTRAVENOUS AS NEEDED
Status: DISCONTINUED | OUTPATIENT
Start: 2018-06-17 | End: 2018-06-17

## 2018-06-17 RX ORDER — SIMETHICONE 80 MG
80 TABLET,CHEWABLE ORAL 3 TIMES DAILY PRN
Status: DISCONTINUED | OUTPATIENT
Start: 2018-06-17 | End: 2018-06-19

## 2018-06-17 RX ORDER — AMMONIA INHALANTS 0.04 G/.3ML
0.3 INHALANT RESPIRATORY (INHALATION) AS NEEDED
Status: DISCONTINUED | OUTPATIENT
Start: 2018-06-17 | End: 2018-06-17

## 2018-06-17 RX ORDER — SODIUM CHLORIDE, SODIUM LACTATE, POTASSIUM CHLORIDE, CALCIUM CHLORIDE 600; 310; 30; 20 MG/100ML; MG/100ML; MG/100ML; MG/100ML
INJECTION, SOLUTION INTRAVENOUS
Status: DISPENSED
Start: 2018-06-17 | End: 2018-06-18

## 2018-06-17 RX ORDER — TERBUTALINE SULFATE 1 MG/ML
0.25 INJECTION, SOLUTION SUBCUTANEOUS AS NEEDED
Status: DISCONTINUED | OUTPATIENT
Start: 2018-06-17 | End: 2018-06-17

## 2018-06-17 RX ORDER — SODIUM CHLORIDE 0.9 % (FLUSH) 0.9 %
10 SYRINGE (ML) INJECTION AS NEEDED
Status: DISCONTINUED | OUTPATIENT
Start: 2018-06-17 | End: 2018-06-17

## 2018-06-17 RX ORDER — DEXTROSE, SODIUM CHLORIDE, SODIUM LACTATE, POTASSIUM CHLORIDE, AND CALCIUM CHLORIDE 5; .6; .31; .03; .02 G/100ML; G/100ML; G/100ML; G/100ML; G/100ML
125 INJECTION, SOLUTION INTRAVENOUS CONTINUOUS
Status: DISCONTINUED | OUTPATIENT
Start: 2018-06-17 | End: 2018-06-17

## 2018-06-17 RX ORDER — BUPIVACAINE HYDROCHLORIDE 2.5 MG/ML
INJECTION, SOLUTION EPIDURAL; INFILTRATION; INTRACAUDAL AS NEEDED
Status: DISCONTINUED | OUTPATIENT
Start: 2018-06-17 | End: 2018-06-17 | Stop reason: SURG

## 2018-06-17 RX ORDER — IBUPROFEN 600 MG/1
600 TABLET ORAL ONCE AS NEEDED
Status: DISCONTINUED | OUTPATIENT
Start: 2018-06-17 | End: 2018-06-17

## 2018-06-17 RX ORDER — IBUPROFEN 600 MG/1
600 TABLET ORAL EVERY 6 HOURS PRN
Status: DISCONTINUED | OUTPATIENT
Start: 2018-06-17 | End: 2018-06-19

## 2018-06-17 RX ORDER — EPHEDRINE SULFATE/0.9% NACL/PF 25 MG/5 ML
SYRINGE (ML) INTRAVENOUS
Status: DISCONTINUED
Start: 2018-06-17 | End: 2018-06-17 | Stop reason: WASHOUT

## 2018-06-17 RX ORDER — NALBUPHINE HCL 10 MG/ML
2.5 AMPUL (ML) INJECTION
Status: DISCONTINUED | OUTPATIENT
Start: 2018-06-17 | End: 2018-06-17

## 2018-06-17 RX ORDER — DEXTROSE, SODIUM CHLORIDE, SODIUM LACTATE, POTASSIUM CHLORIDE, AND CALCIUM CHLORIDE 5; .6; .31; .03; .02 G/100ML; G/100ML; G/100ML; G/100ML; G/100ML
INJECTION, SOLUTION INTRAVENOUS
Status: DISPENSED
Start: 2018-06-17 | End: 2018-06-18

## 2018-06-17 RX ORDER — ONDANSETRON 2 MG/ML
4 INJECTION INTRAMUSCULAR; INTRAVENOUS EVERY 6 HOURS PRN
Status: DISCONTINUED | OUTPATIENT
Start: 2018-06-17 | End: 2018-06-19

## 2018-06-17 RX ORDER — LIDOCAINE HYDROCHLORIDE AND EPINEPHRINE 20; 5 MG/ML; UG/ML
INJECTION, SOLUTION EPIDURAL; INFILTRATION; INTRACAUDAL; PERINEURAL
Status: DISCONTINUED
Start: 2018-06-17 | End: 2018-06-17 | Stop reason: WASHOUT

## 2018-06-17 RX ORDER — BISACODYL 10 MG
10 SUPPOSITORY, RECTAL RECTAL ONCE AS NEEDED
Status: DISCONTINUED | OUTPATIENT
Start: 2018-06-17 | End: 2018-06-19

## 2018-06-17 RX ORDER — DIAPER,BRIEF,INFANT-TODD,DISP
1 EACH MISCELLANEOUS EVERY 6 HOURS PRN
Status: DISCONTINUED | OUTPATIENT
Start: 2018-06-17 | End: 2018-06-19

## 2018-06-17 RX ORDER — LIDOCAINE HYDROCHLORIDE 10 MG/ML
INJECTION, SOLUTION EPIDURAL; INFILTRATION; INTRACAUDAL; PERINEURAL AS NEEDED
Status: DISCONTINUED | OUTPATIENT
Start: 2018-06-17 | End: 2018-06-17 | Stop reason: SURG

## 2018-06-17 RX ADMIN — LIDOCAINE HYDROCHLORIDE AND EPINEPHRINE 3 ML: 15; 5 INJECTION, SOLUTION EPIDURAL at 15:05:00

## 2018-06-17 RX ADMIN — LIDOCAINE HYDROCHLORIDE 3 ML: 10 INJECTION, SOLUTION EPIDURAL; INFILTRATION; INTRACAUDAL; PERINEURAL at 15:00:00

## 2018-06-17 RX ADMIN — BUPIVACAINE HYDROCHLORIDE 2 MG: 2.5 INJECTION, SOLUTION EPIDURAL; INFILTRATION; INTRACAUDAL at 15:04:00

## 2018-06-17 NOTE — PROGRESS NOTES
6/17/2018, 4:57 PM    Subjective:    Called for 4 min decel that resolved with position change  VE unchanged  IUPC placed without difficulty  FSE applied      FHT now 150s with min-mod variability, no decels  Ctx q 2-3 min x 20 mmHg change      Assessment/

## 2018-06-17 NOTE — H&P
[facility] Patient Status:  Inpatient    1985 MRN [id number]   Location [address] [place] [address] Attending Aretha Ybarra MD   Hosp Day # 0 PCP None Pcp     Date of Admission:  20 # Outcome Date GA Lbr Beck/2nd Weight Sex Delivery Anes PTL Lv   2 Current            1 Term 09/07/16 40w6d  8 lb (3.629 kg) F NORMAL SPONT  N TODD      Complications:  Other specified trauma to perineum and vulva,Third degree perineal laceration during neg mg/dL   BILIRUBIN neg Negative   KETONES (URINE DIPSTICK) neg Negative mg/dL   SPECIFIC GRAVITY 1.005 1.005 - 1.030   OCCULT BLOOD neg Negative   PH, URINE 7.5 4.5 - 8.0   PROTEIN (URINE DIPSTICK) neg Negative/Trace mg/dL   UROBILINOGEN,SEMI-QN 0 0.0 -

## 2018-06-17 NOTE — PROGRESS NOTES
6/17/2018, 3:52 PM    Subjective:     Comfortable with epidural    Objective:   06/17/18  1516 06/17/18  1519 06/17/18  1520 06/17/18  1530   BP: 115/71 121/67  110/64   Pulse: 77 78 76 75   SpO2:   99% 100%     No intake or output data in the 24 hours end

## 2018-06-17 NOTE — ANESTHESIA PROCEDURE NOTES
Labor Analgesia  Performed by: Claire December by: Michelle Patel     Patient Location:  OB  Start Time:  6/17/2018 2:55 PM  End Time:  6/17/2018 3:05 PM  Reason for Block: labor epidural    Anesthesiologist:  Michelle Patel  Performed by:   Owen

## 2018-06-17 NOTE — TRIAGE
[facility]      Triage Note    Donnajoe Arredondo Patient Status:  Observation    1985 MRN [id number]   Location [facility] Attending Diomedes Penaloza MD   Hosp Day # 0 PCP None Pcp        Para:    Gia Huong Ely, RN  6/17/2018 12:44 AM      Dx:  Latent labor  I have reviewed the NST and agree with the above findings and recommendations.    Pedro Bryan MD    6/17/2018    2:16 PM

## 2018-06-17 NOTE — ANESTHESIA PREPROCEDURE EVALUATION
Anesthesia PreOp Note    HPI:     Jackie Morse is a 26year old female who presents for preoperative consultation requested by: * No surgeons listed *    Date of Surgery: 6/17/2018    * No procedures listed *  Indication: * No pre-op diagnosis entered * injection 0.25 mg 0.25 mg Subcutaneous PRN Eleazar Edmond MD     Sod Citrate-Citric Acid [facility]) solution 30 mL 30 mL Oral PRN Eleazar Edmond MD     Ammonia Aromatic (ammonia) nasal solution 0.3 mL 0.3 mL Nasal PRN Eleazar Edmond MD     lactated ringers infusion file       Available pre-op labs reviewed.     Lab Results  Component Value Date   WBC 9.1 06/17/2018   RBC 4.07 06/17/2018   HGB 12.2 06/17/2018   HCT 35.1 06/17/2018   MCV 86.3 06/17/2018   MCH 30.0 06/17/2018   MCHC 34.7 06/17/2018   RDW 13.3 06/17/2018

## 2018-06-18 ENCOUNTER — APPOINTMENT (OUTPATIENT)
Dept: INTEGRATIVE MEDICINE | Facility: HOSPITAL | Age: 33
End: 2018-06-18
Attending: GENERAL ACUTE CARE HOSPITAL
Payer: COMMERCIAL

## 2018-06-18 NOTE — PROGRESS NOTES
PT WAS TRANSFERRED TO PP UNIT VIA WHEELCHAIR HOLDING , BOTH IN STABLE CONDITION. SIGNIFICANT OTHER PRESENT CARRYING BELONGINGS.  UPON ARRIVAL TO ROOM 357 PT WAS ASSISTED TO BED, ORIENTED TO ENVIRONMENT, CALL LIGHT PLACED WITHIN REACH, AND INSTRUCTED

## 2018-06-18 NOTE — PROGRESS NOTES
[facility]    Post  Progress Note      Hanny Mullins Patient Status:  Inpatient    1985 MRN [id number]   Location [facility]SE Attending Ammon Looney MD   Hosp Day # 1 PCP None Pcp       Subjective     Good pain control.

## 2018-06-18 NOTE — ANESTHESIA POSTPROCEDURE EVALUATION
Patient: Cecilia Suazo    Procedure Summary     Date:  06/17/18 Room / Location:      Anesthesia Start:  3450 Anesthesia Stop:  2002    Procedure:  LABOR ANALGESIA Diagnosis:      Scheduled Providers:   Anesthesiologist:  Slade Mejia DO    Anesthesia Ty

## 2018-06-18 NOTE — PLAN OF CARE
Patient Centered Care    • Patient preferences are identified and integrated in the patient's plan of care Progressing        POSTPARTUM    • [address] normal postpartum course Progressing    • Optimize infant feeding at the breast Progres

## 2018-06-18 NOTE — L&D DELIVERY NOTE
[facility]    Vaginal Delivery Note    Florencia Valladares Patient Status:  Inpatient    1985 MRN [id number]   Location [facility] Attending Eleazar Edmond MD   Hosp Day # 0 PCP None Pcp       Delivery     Infant  D

## 2018-06-18 NOTE — DISCHARGE SUMMARY
[facility]    Discharge Summary    Meliton Rajput Patient Status:  Inpatient    1985 MRN [id number]   Location [address]  Attending Ness Ch MD   [facility] Day # 0       Delivering OB Clinician:  Dr Thom Torres

## 2018-06-18 NOTE — LACTATION NOTE
LACTATION NOTE - MOTHER      Evaluation Type: Inpatient    Problems identified  Problems identified: Knowledge deficit    Maternal history  Maternal history: AMA    Breastfeeding goal  Breastfeeding goal: To maintain breast milk feeding per patient goal

## 2018-06-19 ENCOUNTER — APPOINTMENT (OUTPATIENT)
Dept: INTEGRATIVE MEDICINE | Facility: HOSPITAL | Age: 33
End: 2018-06-19
Attending: GENERAL ACUTE CARE HOSPITAL
Payer: COMMERCIAL

## 2018-06-19 VITALS
OXYGEN SATURATION: 99 % | SYSTOLIC BLOOD PRESSURE: 131 MMHG | HEART RATE: 73 BPM | TEMPERATURE: 98 F | RESPIRATION RATE: 16 BRPM | DIASTOLIC BLOOD PRESSURE: 72 MMHG

## 2018-06-22 NOTE — PROGRESS NOTES
AVNI Mancuso, NMD     [facility] came today to get help in her pregnancy.   Tongue: pale coat with scallops and swollen.     Pulse DELMIS Weak and deep as well as the SP KD deep and weak            HT weak and deficient, LV tight, KD deep and weak

## 2018-06-23 PROBLEM — O09.523 AMA (ADVANCED MATERNAL AGE) MULTIGRAVIDA 35+, THIRD TRIMESTER (HCC): Status: ACTIVE | Noted: 2018-05-29

## 2018-06-23 PROBLEM — O09.523 AMA (ADVANCED MATERNAL AGE) MULTIGRAVIDA 35+, THIRD TRIMESTER: Status: ACTIVE | Noted: 2018-05-29

## 2018-08-07 ENCOUNTER — POSTPARTUM (OUTPATIENT)
Dept: OBGYN CLINIC | Facility: CLINIC | Age: 33
End: 2018-08-07
Payer: COMMERCIAL

## 2018-08-07 VITALS
SYSTOLIC BLOOD PRESSURE: 119 MMHG | DIASTOLIC BLOOD PRESSURE: 69 MMHG | BODY MASS INDEX: 24 KG/M2 | WEIGHT: 146 LBS | HEART RATE: 64 BPM

## 2018-08-08 NOTE — PROGRESS NOTES
HPI    Amandeep Posada is a 26year old female  here for 6 week post-partum visit. Patient delivered a  female infant on 18 by . Patient desires condoms for contraception. Patient is breast feeding.    Patient No symptoms of depression, Baljinder

## 2018-11-06 ENCOUNTER — OFFICE VISIT (OUTPATIENT)
Dept: FAMILY MEDICINE CLINIC | Facility: CLINIC | Age: 33
End: 2018-11-06
Payer: COMMERCIAL

## 2018-11-06 VITALS
RESPIRATION RATE: 20 BRPM | BODY MASS INDEX: 23.73 KG/M2 | HEIGHT: 64 IN | DIASTOLIC BLOOD PRESSURE: 69 MMHG | SYSTOLIC BLOOD PRESSURE: 103 MMHG | TEMPERATURE: 98 F | HEART RATE: 80 BPM | WEIGHT: 139 LBS

## 2018-11-06 DIAGNOSIS — M67.449 GANGLION CYST OF FINGER: Primary | ICD-10-CM

## 2018-11-06 PROCEDURE — 99212 OFFICE O/P EST SF 10 MIN: CPT | Performed by: FAMILY MEDICINE

## 2018-11-06 PROCEDURE — 99213 OFFICE O/P EST LOW 20 MIN: CPT | Performed by: FAMILY MEDICINE

## 2018-11-06 NOTE — PROGRESS NOTES
HPI:    Patient ID: Freya Rose is a 26year old female. Pt presents with a lump of her left 4th finger that has grown over the last year. NO injury or sig pains.          Review of Systems           Current Outpatient Medications:  docusate sodium 100

## 2018-11-15 ENCOUNTER — HOSPITAL ENCOUNTER (OUTPATIENT)
Dept: GENERAL RADIOLOGY | Facility: HOSPITAL | Age: 33
Discharge: HOME | End: 2018-11-15
Attending: PLASTIC SURGERY
Payer: COMMERCIAL

## 2018-11-15 ENCOUNTER — OFFICE VISIT (OUTPATIENT)
Dept: SURGERY | Facility: CLINIC | Age: 33
End: 2018-11-15
Payer: COMMERCIAL

## 2018-11-15 DIAGNOSIS — M67.442 GANGLION OF LEFT HAND: ICD-10-CM

## 2018-11-15 DIAGNOSIS — M67.442 GANGLION OF LEFT HAND: Primary | ICD-10-CM

## 2018-11-15 PROCEDURE — 73140 X-RAY EXAM OF FINGER(S): CPT | Performed by: PLASTIC SURGERY

## 2018-11-15 PROCEDURE — 99243 OFF/OP CNSLTJ NEW/EST LOW 30: CPT | Performed by: PLASTIC SURGERY

## 2018-11-15 PROCEDURE — 99212 OFFICE O/P EST SF 10 MIN: CPT | Performed by: PLASTIC SURGERY

## 2018-11-15 NOTE — H&P
Peg Orf is a 26year old female that presents with Patient presents with:  Ganglion: LRF radial PIP  .     REFERRED BY:  Sandra Silva    Pacemaker: No  Latex Allergy: no  Coumadin: No  Plavix: No  Other anticoagulants: No  Cardiac stents: No    HAND insecurity - inability: Not on file      Transportation needs - medical: Not on file      Transportation needs - non-medical: Not on file    Occupational History      Occupation: Homemaker      Occupation: Marketing        Comment: maternity leave    [facility] Seeo Normal, Left: Normal; EOMI  EARS: Inspection - Right: Normal, Left: Normal  NECK/THYROID: Inspection - Normal, Palpation - Normal, Thyroid gland - Normal, No adenopathy  RESPIRATORY: Inspection - Normal, Effort - Normal  CARDIOVASCULAR: Regular rhythm, No

## 2019-01-03 ENCOUNTER — IMMUNIZATION (OUTPATIENT)
Dept: PEDIATRICS CLINIC | Facility: CLINIC | Age: 34
End: 2019-01-03
Payer: COMMERCIAL

## 2019-01-03 DIAGNOSIS — Z23 NEED FOR VACCINATION: ICD-10-CM

## 2019-01-03 PROCEDURE — 90471 IMMUNIZATION ADMIN: CPT | Performed by: PEDIATRICS

## 2019-01-03 PROCEDURE — 90686 IIV4 VACC NO PRSV 0.5 ML IM: CPT | Performed by: PEDIATRICS

## 2019-06-25 ENCOUNTER — APPOINTMENT (OUTPATIENT)
Dept: LAB | Facility: HOSPITAL | Age: 34
End: 2019-06-25
Attending: OBSTETRICS & GYNECOLOGY
Payer: COMMERCIAL

## 2019-06-25 ENCOUNTER — TELEPHONE (OUTPATIENT)
Dept: OBGYN CLINIC | Facility: CLINIC | Age: 34
End: 2019-06-25

## 2019-06-25 DIAGNOSIS — Z32.01 PREGNANCY EXAMINATION OR TEST, POSITIVE RESULT: Primary | ICD-10-CM

## 2019-06-25 DIAGNOSIS — Z32.01 PREGNANCY EXAMINATION OR TEST, POSITIVE RESULT: ICD-10-CM

## 2019-06-25 PROCEDURE — 84702 CHORIONIC GONADOTROPIN TEST: CPT

## 2019-06-25 PROCEDURE — 36415 COLL VENOUS BLD VENIPUNCTURE: CPT

## 2019-06-25 NOTE — TELEPHONE ENCOUNTER
Pt did an upt and it was positive. Pt states no period over 3 years. Pt was breastfeeding and stopped a few weeks ago. Pt used an ovulation kit and it showed ovulation two weeks ago.      Pt would like a hcg quant to see how far along she is with this p

## 2019-06-25 NOTE — TELEPHONE ENCOUNTER
Pt notified the doctor authorized [facility] lab. Provided pt with lab hours and advised to call us tomorrow morning for the result.

## 2019-06-25 NOTE — TELEPHONE ENCOUNTER
Pt took preg test and positive, has a 3 yr old and has not gotten menses in 2 yrs, would like to discuss

## 2019-06-26 ENCOUNTER — TELEPHONE (OUTPATIENT)
Dept: OBGYN CLINIC | Facility: CLINIC | Age: 34
End: 2019-06-26

## 2019-06-26 DIAGNOSIS — Z34.91 PREGNANCY WITH UNCERTAIN DATES IN FIRST TRIMESTER: Primary | ICD-10-CM

## 2019-06-26 NOTE — TELEPHONE ENCOUNTER
Pt is pregnant, states she is getting a bridge placed tomorrow and wants to know if it is safe to be numbed up. Informed pt I will send her a dental letter via [employer].  Pt also asked about her hcg result which BRADLEY reviewed and he wants her to repeat it in

## 2019-06-26 NOTE — TELEPHONE ENCOUNTER
Pt has dentist appointment tomorrow morning and has questions regarding dental procedure while being pregnant. States she believes she's about 5 weeks. Requesting to speak with nurse. Please advise.

## 2019-06-28 ENCOUNTER — TELEPHONE (OUTPATIENT)
Dept: OBGYN CLINIC | Facility: CLINIC | Age: 34
End: 2019-06-28

## 2019-06-28 ENCOUNTER — APPOINTMENT (OUTPATIENT)
Dept: LAB | Facility: HOSPITAL | Age: 34
End: 2019-06-28
Attending: OBSTETRICS & GYNECOLOGY
Payer: COMMERCIAL

## 2019-06-28 DIAGNOSIS — Z34.91 FIRST TRIMESTER PREGNANCY: Primary | ICD-10-CM

## 2019-06-28 DIAGNOSIS — Z34.91 PREGNANCY WITH UNCERTAIN DATES IN FIRST TRIMESTER: ICD-10-CM

## 2019-06-28 LAB — B-HCG SERPL-ACNC: 7083 MIU/ML

## 2019-06-28 PROCEDURE — 84702 CHORIONIC GONADOTROPIN TEST: CPT

## 2019-06-28 PROCEDURE — 36415 COLL VENOUS BLD VENIPUNCTURE: CPT

## 2019-06-28 NOTE — TELEPHONE ENCOUNTER
Per PHOENIX patel, have pt repeat quant on Monday morning. Call the office before that with any bleeding or cramping. Pt informed of results and recs. Order placed.

## 2019-07-01 ENCOUNTER — APPOINTMENT (OUTPATIENT)
Dept: LAB | Facility: HOSPITAL | Age: 34
End: 2019-07-01
Attending: OBSTETRICS & GYNECOLOGY
Payer: COMMERCIAL

## 2019-07-01 ENCOUNTER — TELEPHONE (OUTPATIENT)
Dept: OBGYN CLINIC | Facility: CLINIC | Age: 34
End: 2019-07-01

## 2019-07-01 ENCOUNTER — HOSPITAL ENCOUNTER (OUTPATIENT)
Dept: ULTRASOUND IMAGING | Facility: HOSPITAL | Age: 34
Discharge: HOME | End: 2019-07-01
Attending: OBSTETRICS & GYNECOLOGY
Payer: COMMERCIAL

## 2019-07-01 DIAGNOSIS — Z3A.01 6 WEEKS GESTATION OF PREGNANCY: ICD-10-CM

## 2019-07-01 DIAGNOSIS — Z34.90 EARLY STAGE OF PREGNANCY: ICD-10-CM

## 2019-07-01 DIAGNOSIS — Z34.90 EARLY STAGE OF PREGNANCY: Primary | ICD-10-CM

## 2019-07-01 DIAGNOSIS — Z3A.01 6 WEEKS GESTATION OF PREGNANCY: Primary | ICD-10-CM

## 2019-07-01 DIAGNOSIS — Z34.91 FIRST TRIMESTER PREGNANCY: ICD-10-CM

## 2019-07-01 LAB
B-HCG SERPL-ACNC: ABNORMAL MIU/ML
PROGEST SERPL-MCNC: 18.2 NG/ML

## 2019-07-01 PROCEDURE — 84144 ASSAY OF PROGESTERONE: CPT

## 2019-07-01 PROCEDURE — 76817 TRANSVAGINAL US OBSTETRIC: CPT | Performed by: OBSTETRICS & GYNECOLOGY

## 2019-07-01 PROCEDURE — 76801 OB US < 14 WKS SINGLE FETUS: CPT | Performed by: OBSTETRICS & GYNECOLOGY

## 2019-07-01 PROCEDURE — 84702 CHORIONIC GONADOTROPIN TEST: CPT

## 2019-07-01 PROCEDURE — 36415 COLL VENOUS BLD VENIPUNCTURE: CPT

## 2019-07-01 NOTE — TELEPHONE ENCOUNTER
PER CENTRAL SCHEDULING, THEY HAVE 1 OPENING AT THE [facility] ON WED AND A COUPLE MORE ON FRI. NOTHING ELSE AVAILABLE AT THE OTHER OFFICES. IS THIS OK OR SHOULD WE TRY TO GET HOLD AND CALL [facility]?

## 2019-07-01 NOTE — TELEPHONE ENCOUNTER
PER [facility], THEY CAN GET PT IN TODAY AT 5:30PM.   PT NOTIFIED. ADVISED TO START DRINKING 32 OUNCES OF WATER BY 4:30PM AND NOT URINATE PRIOR TO [facility]. DIRECTIONS GIVEN FOR DIAGNOSTICS MAIN. PT AWARE BRADLEY WILL BE CONTACTED WITH [facility] RESULT.

## 2019-07-01 NOTE — TELEPHONE ENCOUNTER
Paged by [address]. Spoke to Christen, per report 6w1d IUP with small [address] and . Relayed info to Leticia Gordon. She thinks LMP may be 5/16/19. This was very light spotting. First episode in over 1 year as she recently stopped breastfeeding.   Given that LMP s

## 2019-07-01 NOTE — TELEPHONE ENCOUNTER
PT NOTIFIED 7-1 QUANT = 10,390.0.  6-28 QUANT = 7,083.0 AND 6-25 QUANT = 5,399.0. PT AWARE PHOENIX IS NOT IN THE OFFICE TODAY SO WILL FORWARD TO [facility] (ON CALL) FOR RECS TODAY. PT PREFERS NOT TO WAIT FOR PHOENIX RESPONSE TOMORROW.

## 2019-07-02 NOTE — TELEPHONE ENCOUNTER
Jazieltccydney. Does pt want letter sent via my chart or does she wish to  at  at [facility]?

## 2019-07-02 NOTE — TELEPHONE ENCOUNTER
Lab results reviewed by Alvarez Flores over the phone. States he is ok with progesterone level of 18.20. And will not give her anything now. Pt informed of results and BRADLEY's recs. Pt asking what is next for pregnancy care. States delivered with us 6/2018.  Pt informed

## 2019-07-09 ENCOUNTER — NURSE ONLY (OUTPATIENT)
Dept: OBGYN CLINIC | Facility: CLINIC | Age: 34
End: 2019-07-09
Payer: COMMERCIAL

## 2019-07-09 VITALS — BODY MASS INDEX: 21.69 KG/M2 | WEIGHT: 135 LBS | HEIGHT: 66 IN

## 2019-07-09 DIAGNOSIS — Z34.91 ENCOUNTER FOR SUPERVISION OF NORMAL PREGNANCY IN FIRST TRIMESTER, UNSPECIFIED GRAVIDITY: Primary | ICD-10-CM

## 2019-07-09 NOTE — PROGRESS NOTES
Pt had OBN PC appt today with no complaints. Normal PN labs and 1hr gtt ordered. Pt advised all labs must be completed and resulted prior to MD appt. Assisted pt with scheduling NPN appt with BRADLEY on 7/22/19 at 8:40am at [facility].     Pt states she has not star Hemophilia No    [employer] Chorea No    Mental Retardation/Autism No    Muscular Dystrophy No    Neural tube defects No    Sickle Cell Disease or trait No    Devon-Sachs Disease No    Thalassemia No    Other inherited genetic or chromosomal disorders No

## 2019-07-13 ENCOUNTER — LAB ENCOUNTER (OUTPATIENT)
Dept: LAB | Facility: HOSPITAL | Age: 34
End: 2019-07-13
Attending: OBSTETRICS & GYNECOLOGY
Payer: COMMERCIAL

## 2019-07-13 DIAGNOSIS — Z34.91 ENCOUNTER FOR SUPERVISION OF NORMAL PREGNANCY IN FIRST TRIMESTER, UNSPECIFIED GRAVIDITY: ICD-10-CM

## 2019-07-13 LAB
ANTIBODY SCREEN: NEGATIVE
BASOPHILS # BLD AUTO: 0.03 X10(3) UL (ref 0–0.2)
BASOPHILS NFR BLD AUTO: 0.7 %
DEPRECATED RDW RBC AUTO: 40 FL (ref 35.1–46.3)
EOSINOPHIL # BLD AUTO: 0.09 X10(3) UL (ref 0–0.7)
EOSINOPHIL NFR BLD AUTO: 2 %
ERYTHROCYTE [DISTWIDTH] IN BLOOD BY AUTOMATED COUNT: 12.1 % (ref 11–15)
GLUCOSE 1H P GLC SERPL-MCNC: 82 MG/DL
HBV SURFACE AG SER-ACNC: <0.1 [IU]/L
HBV SURFACE AG SERPL QL IA: NONREACTIVE
HCT VFR BLD AUTO: 39.2 % (ref 35–48)
HGB BLD-MCNC: 13.2 G/DL (ref 12–16)
IMM GRANULOCYTES # BLD AUTO: 0.01 X10(3) UL (ref 0–1)
IMM GRANULOCYTES NFR BLD: 0.2 %
LYMPHOCYTES # BLD AUTO: 1.76 X10(3) UL (ref 1–4)
LYMPHOCYTES NFR BLD AUTO: 38.9 %
MCH RBC QN AUTO: 29.9 PG (ref 26–34)
MCHC RBC AUTO-ENTMCNC: 33.7 G/DL (ref 31–37)
MCV RBC AUTO: 88.9 FL (ref 80–100)
MONOCYTES # BLD AUTO: 0.29 X10(3) UL (ref 0.1–1)
MONOCYTES NFR BLD AUTO: 6.4 %
NEUTROPHILS # BLD AUTO: 2.34 X10 (3) UL (ref 1.5–7.7)
NEUTROPHILS # BLD AUTO: 2.34 X10(3) UL (ref 1.5–7.7)
NEUTROPHILS NFR BLD AUTO: 51.8 %
PLATELET # BLD AUTO: 241 10(3)UL (ref 150–450)
RBC # BLD AUTO: 4.41 X10(6)UL (ref 3.8–5.3)
RH BLOOD TYPE: POSITIVE
RUBV IGG SER QL: POSITIVE
RUBV IGG SER-ACNC: 228.8 IU/ML (ref 10–?)
WBC # BLD AUTO: 4.5 X10(3) UL (ref 4–11)

## 2019-07-13 PROCEDURE — 87389 HIV-1 AG W/HIV-1&-2 AB AG IA: CPT

## 2019-07-13 PROCEDURE — 36415 COLL VENOUS BLD VENIPUNCTURE: CPT

## 2019-07-13 PROCEDURE — 86762 RUBELLA ANTIBODY: CPT

## 2019-07-13 PROCEDURE — 86780 TREPONEMA PALLIDUM: CPT

## 2019-07-13 PROCEDURE — 86901 BLOOD TYPING SEROLOGIC RH(D): CPT

## 2019-07-13 PROCEDURE — 86850 RBC ANTIBODY SCREEN: CPT

## 2019-07-13 PROCEDURE — 85025 COMPLETE CBC W/AUTO DIFF WBC: CPT

## 2019-07-13 PROCEDURE — 86900 BLOOD TYPING SEROLOGIC ABO: CPT

## 2019-07-13 PROCEDURE — 82950 GLUCOSE TEST: CPT

## 2019-07-13 PROCEDURE — 87340 HEPATITIS B SURFACE AG IA: CPT

## 2019-07-13 PROCEDURE — 87086 URINE CULTURE/COLONY COUNT: CPT

## 2019-07-15 LAB — T PALLIDUM AB SER QL: NEGATIVE

## 2019-07-22 ENCOUNTER — TELEPHONE (OUTPATIENT)
Dept: OBGYN CLINIC | Facility: CLINIC | Age: 34
End: 2019-07-22

## 2019-07-22 ENCOUNTER — INITIAL PRENATAL (OUTPATIENT)
Dept: OBGYN CLINIC | Facility: CLINIC | Age: 34
End: 2019-07-22
Payer: COMMERCIAL

## 2019-07-22 VITALS
WEIGHT: 134.19 LBS | DIASTOLIC BLOOD PRESSURE: 77 MMHG | BODY MASS INDEX: 22 KG/M2 | HEART RATE: 93 BPM | SYSTOLIC BLOOD PRESSURE: 118 MMHG

## 2019-07-22 DIAGNOSIS — Z34.91 ENCOUNTER FOR SUPERVISION OF NORMAL PREGNANCY IN FIRST TRIMESTER, UNSPECIFIED GRAVIDITY: Primary | ICD-10-CM

## 2019-07-22 PROBLEM — O22.42 HEMORRHOIDS DURING PREGNANCY IN SECOND TRIMESTER: Status: RESOLVED | Noted: 2018-02-06 | Resolved: 2019-07-22

## 2019-07-22 PROBLEM — O09.521 AMA (ADVANCED MATERNAL AGE) MULTIGRAVIDA 35+, FIRST TRIMESTER: Status: ACTIVE | Noted: 2019-07-22

## 2019-07-22 PROBLEM — Z36.89 NST (NON-STRESS TEST) REACTIVE: Status: RESOLVED | Noted: 2018-06-04 | Resolved: 2019-07-22

## 2019-07-22 PROBLEM — O09.523 AMA (ADVANCED MATERNAL AGE) MULTIGRAVIDA 35+, THIRD TRIMESTER (HCC): Status: RESOLVED | Noted: 2018-05-29 | Resolved: 2019-07-22

## 2019-07-22 PROBLEM — O09.521 AMA (ADVANCED MATERNAL AGE) MULTIGRAVIDA 35+, FIRST TRIMESTER (HCC): Status: ACTIVE | Noted: 2019-07-22

## 2019-07-22 PROBLEM — Z34.90 PRENATAL CARE, ANTEPARTUM (HCC): Status: RESOLVED | Noted: 2018-04-29 | Resolved: 2019-07-22

## 2019-07-22 PROBLEM — Z34.90 PRENATAL CARE, ANTEPARTUM: Status: RESOLVED | Noted: 2018-04-29 | Resolved: 2019-07-22

## 2019-07-22 PROBLEM — O22.42 HEMORRHOIDS DURING PREGNANCY IN SECOND TRIMESTER (HCC): Status: RESOLVED | Noted: 2018-02-06 | Resolved: 2019-07-22

## 2019-07-22 PROBLEM — Z34.90 PREGNANCY (HCC): Status: RESOLVED | Noted: 2018-05-22 | Resolved: 2019-07-22

## 2019-07-22 PROBLEM — Z36.89 NST (NON-STRESS TEST) REACTIVE (HCC): Status: RESOLVED | Noted: 2018-06-04 | Resolved: 2019-07-22

## 2019-07-22 PROBLEM — Z34.90 PREGNANCY: Status: RESOLVED | Noted: 2018-05-22 | Resolved: 2019-07-22

## 2019-07-22 PROBLEM — N94.10 DYSPAREUNIA IN FEMALE: Status: RESOLVED | Noted: 2017-11-13 | Resolved: 2019-07-22

## 2019-07-22 PROBLEM — O09.523 AMA (ADVANCED MATERNAL AGE) MULTIGRAVIDA 35+, THIRD TRIMESTER: Status: RESOLVED | Noted: 2018-05-29 | Resolved: 2019-07-22

## 2019-07-22 LAB
APPEARANCE: CLEAR
MULTISTIX LOT#: NORMAL NUMERIC
PH, URINE: 6 (ref 4.5–8)
SPECIFIC GRAVITY: 1 (ref 1–1.03)
URINE-COLOR: YELLOW
UROBILINOGEN,SEMI-QN: 0.2 MG/DL (ref 0–1.9)

## 2019-07-22 PROCEDURE — 76815 OB US LIMITED FETUS(S): CPT | Performed by: OBSTETRICS & GYNECOLOGY

## 2019-07-22 PROCEDURE — 81002 URINALYSIS NONAUTO W/O SCOPE: CPT | Performed by: OBSTETRICS & GYNECOLOGY

## 2019-07-22 NOTE — PROGRESS NOTES
36 yo  @ 9w3d by sure LMP c/w 1TUS here for NOB. NO complaints. Pt wants to speak with  regarding genetics. Labs wnl. PE wnl. Pap UTD. +FHT on US today. GC/CT/Trich collected. RTC 4 wks.

## 2019-07-23 LAB
C TRACH DNA SPEC QL NAA+PROBE: NEGATIVE
N GONORRHOEA DNA SPEC QL NAA+PROBE: NEGATIVE

## 2019-07-24 ENCOUNTER — TELEPHONE (OUTPATIENT)
Dept: OBGYN CLINIC | Facility: CLINIC | Age: 34
End: 2019-07-24

## 2019-07-24 LAB — T VAGINALIS RRNA SPEC QL NAA+PROBE: NEGATIVE

## 2019-07-24 NOTE — TELEPHONE ENCOUNTER
Pt states she is 10 weeks pregnant and has questions about PNV and DHA. She is taking a PNV that only has 50 mg of DHA. She found two DHA supplements, one is 480 mg and one is 960 mg. Asking which she should take. Will review with MD and call her back.  Rev

## 2019-08-13 ENCOUNTER — TELEPHONE (OUTPATIENT)
Dept: OBGYN CLINIC | Facility: CLINIC | Age: 34
End: 2019-08-13

## 2019-08-13 NOTE — TELEPHONE ENCOUNTER
Pt wondering if x-rays and a root canal are safe in pregnancy. Pt informed x-rays are safe as long as the lead apron covers her abdomen. Pt advised we can send a dental letter through [facility] that lists what is safe in pregnancy. Pt agrees to plan.   Luzmaria

## 2019-08-19 ENCOUNTER — ROUTINE PRENATAL (OUTPATIENT)
Dept: OBGYN CLINIC | Facility: CLINIC | Age: 34
End: 2019-08-19
Payer: COMMERCIAL

## 2019-08-19 ENCOUNTER — APPOINTMENT (OUTPATIENT)
Dept: LAB | Facility: HOSPITAL | Age: 34
End: 2019-08-19
Attending: OBSTETRICS & GYNECOLOGY
Payer: COMMERCIAL

## 2019-08-19 ENCOUNTER — TELEPHONE (OUTPATIENT)
Dept: OBGYN CLINIC | Facility: CLINIC | Age: 34
End: 2019-08-19

## 2019-08-19 VITALS
DIASTOLIC BLOOD PRESSURE: 76 MMHG | HEART RATE: 69 BPM | WEIGHT: 139.38 LBS | BODY MASS INDEX: 23 KG/M2 | SYSTOLIC BLOOD PRESSURE: 117 MMHG

## 2019-08-19 DIAGNOSIS — R68.83 CHILLS (WITHOUT FEVER): ICD-10-CM

## 2019-08-19 DIAGNOSIS — Z34.81 ENCOUNTER FOR SUPERVISION OF OTHER NORMAL PREGNANCY IN FIRST TRIMESTER: ICD-10-CM

## 2019-08-19 DIAGNOSIS — Z34.81 ENCOUNTER FOR SUPERVISION OF OTHER NORMAL PREGNANCY IN FIRST TRIMESTER: Primary | ICD-10-CM

## 2019-08-19 DIAGNOSIS — O09.522 MULTIGRAVIDA OF ADVANCED MATERNAL AGE IN SECOND TRIMESTER: Primary | ICD-10-CM

## 2019-08-19 LAB
APPEARANCE: CLEAR
PH, URINE: 6 (ref 4.5–8)
SPECIFIC GRAVITY: 1.01 (ref 1–1.03)
TSI SER-ACNC: 1.58 MIU/ML (ref 0.36–3.74)
URINE-COLOR: YELLOW
UROBILINOGEN,SEMI-QN: 0.2 MG/DL (ref 0–1.9)

## 2019-08-19 PROCEDURE — 36415 COLL VENOUS BLD VENIPUNCTURE: CPT

## 2019-08-19 PROCEDURE — 84443 ASSAY THYROID STIM HORMONE: CPT

## 2019-08-19 PROCEDURE — 81000 URINALYSIS NONAUTO W/SCOPE: CPT | Performed by: OBSTETRICS & GYNECOLOGY

## 2019-08-19 RX ORDER — CHOLECALCIFEROL (VITAMIN D3) 25 MCG
1 TABLET,CHEWABLE ORAL DAILY
COMMUNITY
End: 2021-07-20

## 2019-09-16 ENCOUNTER — ROUTINE PRENATAL (OUTPATIENT)
Dept: OBGYN CLINIC | Facility: CLINIC | Age: 34
End: 2019-09-16
Payer: COMMERCIAL

## 2019-09-16 VITALS — WEIGHT: 145 LBS | SYSTOLIC BLOOD PRESSURE: 114 MMHG | DIASTOLIC BLOOD PRESSURE: 71 MMHG | BODY MASS INDEX: 23 KG/M2

## 2019-09-16 DIAGNOSIS — Z34.92 NORMAL PREGNANCY IN SECOND TRIMESTER: Primary | ICD-10-CM

## 2019-09-16 LAB
APPEARANCE: CLEAR
MULTISTIX LOT#: NORMAL NUMERIC
PH, URINE: 6 (ref 4.5–8)
SPECIFIC GRAVITY: 1.02 (ref 1–1.03)
URINE-COLOR: YELLOW
UROBILINOGEN,SEMI-QN: 0.2 MG/DL (ref 0–1.9)

## 2019-09-16 PROCEDURE — 81002 URINALYSIS NONAUTO W/O SCOPE: CPT | Performed by: OBSTETRICS & GYNECOLOGY

## 2019-10-15 ENCOUNTER — IMMUNIZATION (OUTPATIENT)
Dept: PEDIATRICS CLINIC | Facility: CLINIC | Age: 34
End: 2019-10-15
Payer: COMMERCIAL

## 2019-10-15 DIAGNOSIS — Z23 NEED FOR VACCINATION: ICD-10-CM

## 2019-10-15 PROCEDURE — 90686 IIV4 VACC NO PRSV 0.5 ML IM: CPT | Performed by: PEDIATRICS

## 2019-10-15 PROCEDURE — 90471 IMMUNIZATION ADMIN: CPT | Performed by: PEDIATRICS

## 2019-10-16 ENCOUNTER — ROUTINE PRENATAL (OUTPATIENT)
Dept: OBGYN CLINIC | Facility: CLINIC | Age: 34
End: 2019-10-16
Payer: COMMERCIAL

## 2019-10-16 VITALS
WEIGHT: 155 LBS | HEART RATE: 78 BPM | SYSTOLIC BLOOD PRESSURE: 100 MMHG | DIASTOLIC BLOOD PRESSURE: 63 MMHG | BODY MASS INDEX: 25 KG/M2

## 2019-10-16 DIAGNOSIS — Z34.92 ENCOUNTER FOR SUPERVISION OF NORMAL PREGNANCY IN SECOND TRIMESTER, UNSPECIFIED GRAVIDITY: Primary | ICD-10-CM

## 2019-10-16 PROCEDURE — 81002 URINALYSIS NONAUTO W/O SCOPE: CPT | Performed by: OBSTETRICS & GYNECOLOGY

## 2019-10-17 ENCOUNTER — HOSPITAL ENCOUNTER (OUTPATIENT)
Dept: PERINATAL CARE | Facility: HOSPITAL | Age: 34
Discharge: HOME | End: 2019-10-17
Attending: OBSTETRICS & GYNECOLOGY
Payer: COMMERCIAL

## 2019-10-17 VITALS
SYSTOLIC BLOOD PRESSURE: 111 MMHG | WEIGHT: 155 LBS | BODY MASS INDEX: 24.91 KG/M2 | HEART RATE: 82 BPM | DIASTOLIC BLOOD PRESSURE: 67 MMHG | HEIGHT: 66 IN

## 2019-10-17 DIAGNOSIS — O09.521 AMA (ADVANCED MATERNAL AGE) MULTIGRAVIDA 35+, FIRST TRIMESTER: Primary | ICD-10-CM

## 2019-10-17 DIAGNOSIS — O09.521 AMA (ADVANCED MATERNAL AGE) MULTIGRAVIDA 35+, FIRST TRIMESTER: ICD-10-CM

## 2019-10-17 PROCEDURE — 76811 OB US DETAILED SNGL FETUS: CPT | Performed by: OBSTETRICS & GYNECOLOGY

## 2019-10-17 PROCEDURE — 99243 OFF/OP CNSLTJ NEW/EST LOW 30: CPT | Performed by: OBSTETRICS & GYNECOLOGY

## 2019-10-17 NOTE — PROGRESS NOTES
Outpatient Maternal-Fetal Medicine Consultation    Dear Dr. Judith Pérez    Thank you for requesting ultrasound evaluation and maternal fetal medicine consultation on your patient Maged Azul.   As you are aware she is a 37year old female  with a sing (70.3 kg)   LMP 05/17/2019 (Exact Date)   BMI 25.02 kg/m²   General: alert and oriented,no acute distress  Abdomen: gravid, soft, non-tender  Extremities: non-tender, no edema    OBSTETRIC ULTRASOUND  The patient had a level II ultrasound today which revea that pregnancies in women of advanced maternal age (28 or older at delivery) are associated with elevated risks.   Specifically, there is a higher rate of:    · Fetal malformations  · Preeclampsia  · Gestational diabetes  · Intrauterine fetal death    As a trimester multiple-marker serum serum screening as alternative aneuploidy screening options were also reviewed. However, both of these tests are associated with lower detection and higher false positive rates.     The patient has declined screening and antonio

## 2019-10-18 ENCOUNTER — HOSPITAL ENCOUNTER (OUTPATIENT)
Dept: PERINATAL CARE | Facility: HOSPITAL | Age: 34
Discharge: HOME | End: 2019-10-18
Attending: OBSTETRICS & GYNECOLOGY
Payer: COMMERCIAL

## 2019-10-18 DIAGNOSIS — O09.522 AMA (ADVANCED MATERNAL AGE) MULTIGRAVIDA 35+, SECOND TRIMESTER: Primary | ICD-10-CM

## 2019-10-18 PROCEDURE — 36415 COLL VENOUS BLD VENIPUNCTURE: CPT

## 2019-10-23 ENCOUNTER — TELEPHONE (OUTPATIENT)
Dept: PERINATAL CARE | Facility: HOSPITAL | Age: 34
End: 2019-10-23

## 2019-10-23 NOTE — TELEPHONE ENCOUNTER
HARMONY screening results obt  Reviewed by MD Jessi Allen  Low risk for  Trisomy 21  1:[id number] risk  Low risk for Trisomy 18/13  1:49575 risk    Fetal sex: not chosen    Pt states understanding  Copy of results sent for scanning into pt record

## 2019-10-24 ENCOUNTER — TELEPHONE (OUTPATIENT)
Dept: OBGYN CLINIC | Facility: CLINIC | Age: 34
End: 2019-10-24

## 2019-10-24 NOTE — TELEPHONE ENCOUNTER
[facility] Cher[facility] Adler orange folder and Pastor George folder

## 2019-10-28 ENCOUNTER — TELEPHONE (OUTPATIENT)
Dept: OBGYN CLINIC | Facility: CLINIC | Age: 34
End: 2019-10-28

## 2019-10-28 NOTE — TELEPHONE ENCOUNTER
Informed pt that I could fax her dental letter to her dentist. Pt asked that we send it thru Mychart to her. Sent letter.

## 2019-10-31 NOTE — TELEPHONE ENCOUNTER
Pt states she was given Rx Amoxicillin 500mg qid for 1 week by her dentist.  Pt would like to know if this is safe to use in pregnancy. Pt was advised this is safe to take in pregnancy. Pt agrees with plan.

## 2019-11-04 ENCOUNTER — TELEPHONE (OUTPATIENT)
Dept: OBGYN CLINIC | Facility: CLINIC | Age: 34
End: 2019-11-04

## 2019-11-04 NOTE — TELEPHONE ENCOUNTER
Pt is having surgery for dental work, asking if Quibb, also known as CT\" is okay to do while pregnant.  pls adv further

## 2019-11-04 NOTE — TELEPHONE ENCOUNTER
PT WAS TOLD THIS PARTICULAR TEST IS NOT ONE DONE TYPICALLY IN THE DENTAL OFFICE. THEY ACTUALLY COME TO YOUR HOME AND DO THE TEST IS A VAN. ADVISED TO GET THE INFO ON THIS SPECIFIC TEST AND FAX TO US SO WE CAN GET PHYSICIAN APPROVAL OR DENIAL. FAX NUMBER GIVEN. UNTIL WE GET MORE INFO WE CANNOT SAY THIS TEST IS SAFE. THIS TEST IS NEEDED FOR A BONE IMPLANT/GRAFT.

## 2019-11-15 ENCOUNTER — ROUTINE PRENATAL (OUTPATIENT)
Dept: OBGYN CLINIC | Facility: CLINIC | Age: 34
End: 2019-11-15
Payer: COMMERCIAL

## 2019-11-15 VITALS
WEIGHT: 156.19 LBS | DIASTOLIC BLOOD PRESSURE: 67 MMHG | SYSTOLIC BLOOD PRESSURE: 109 MMHG | HEART RATE: 83 BPM | BODY MASS INDEX: 25 KG/M2

## 2019-11-15 DIAGNOSIS — Z34.92 ENCOUNTER FOR SUPERVISION OF NORMAL PREGNANCY IN SECOND TRIMESTER, UNSPECIFIED GRAVIDITY: Primary | ICD-10-CM

## 2019-11-15 PROBLEM — O35.HXX0 SHORT FEMUR OF FETUS ON PRENATAL ULTRASOUND (HCC): Status: ACTIVE | Noted: 2019-11-15

## 2019-11-15 PROBLEM — O35.8XX0 SHORT FEMUR OF FETUS ON PRENATAL ULTRASOUND: Status: ACTIVE | Noted: 2019-11-15

## 2019-11-15 PROBLEM — O35.HXX0 SHORT FEMUR OF FETUS ON PRENATAL ULTRASOUND: Status: ACTIVE | Noted: 2019-11-15

## 2019-11-15 PROCEDURE — 81002 URINALYSIS NONAUTO W/O SCOPE: CPT | Performed by: OBSTETRICS & GYNECOLOGY

## 2019-11-15 RX ORDER — AMOXICILLIN 500 MG/1
CAPSULE ORAL
Refills: 0 | COMMUNITY
Start: 2019-11-07 | End: 2019-12-05

## 2019-11-15 NOTE — PROGRESS NOTES
We discussed MFM findings and recs. I agree with F/U MFM to assure growth. We also discussed dental anesthetic w/ and w/o Epi. It's OK to use either. Epinephrine is a vasoconstrictor with transient / minimal systemic effect.

## 2019-11-18 ENCOUNTER — APPOINTMENT (OUTPATIENT)
Dept: LAB | Facility: HOSPITAL | Age: 34
End: 2019-11-18
Attending: OBSTETRICS & GYNECOLOGY
Payer: COMMERCIAL

## 2019-11-18 DIAGNOSIS — Z34.92 ENCOUNTER FOR SUPERVISION OF NORMAL PREGNANCY IN SECOND TRIMESTER, UNSPECIFIED GRAVIDITY: ICD-10-CM

## 2019-11-18 PROCEDURE — 82950 GLUCOSE TEST: CPT

## 2019-11-18 PROCEDURE — 36415 COLL VENOUS BLD VENIPUNCTURE: CPT

## 2019-11-18 PROCEDURE — 85027 COMPLETE CBC AUTOMATED: CPT

## 2019-11-23 ENCOUNTER — TELEPHONE (OUTPATIENT)
Dept: PEDIATRICS CLINIC | Facility: CLINIC | Age: 34
End: 2019-11-23

## 2019-11-23 NOTE — TELEPHONE ENCOUNTER
Pt. Requesting to speak to nurse regarding her lab results. Pt. Concerned about some low results. Pt states that she is 27 weeks pregnant.

## 2019-11-23 NOTE — TELEPHONE ENCOUNTER
Pt saw her labs on mychart and is worried about the low levels. Pt informed her blood test is fine. Pt informed that her Hgb is good. We usually do not have pt's add iron until the level is at 10.5 or lower. Pt states she does not take a pnv with iron due to hemorrhoids. Pt states [facility] knows this and was ok with it. Pt informed that she can try eating iron rich foods if she is worried about her Hgb. Pt still wants labs sent to [facility] for recs on all 3 levels.   Message to [facility].

## 2019-12-02 ENCOUNTER — HOSPITAL ENCOUNTER (OUTPATIENT)
Dept: PERINATAL CARE | Facility: HOSPITAL | Age: 34
Discharge: HOME | End: 2019-12-02
Attending: OBSTETRICS & GYNECOLOGY
Payer: COMMERCIAL

## 2019-12-02 VITALS
DIASTOLIC BLOOD PRESSURE: 74 MMHG | HEIGHT: 66 IN | WEIGHT: 156 LBS | HEART RATE: 99 BPM | SYSTOLIC BLOOD PRESSURE: 115 MMHG | BODY MASS INDEX: 25.07 KG/M2

## 2019-12-02 DIAGNOSIS — O35.8XX0 SHORT FEMUR OF FETUS ON PRENATAL ULTRASOUND: ICD-10-CM

## 2019-12-02 DIAGNOSIS — O09.521 AMA (ADVANCED MATERNAL AGE) MULTIGRAVIDA 35+, FIRST TRIMESTER: ICD-10-CM

## 2019-12-02 DIAGNOSIS — O09.521 AMA (ADVANCED MATERNAL AGE) MULTIGRAVIDA 35+, FIRST TRIMESTER: Primary | ICD-10-CM

## 2019-12-02 PROCEDURE — 76816 OB US FOLLOW-UP PER FETUS: CPT | Performed by: OBSTETRICS & GYNECOLOGY

## 2019-12-02 PROCEDURE — 99213 OFFICE O/P EST LOW 20 MIN: CPT | Performed by: OBSTETRICS & GYNECOLOGY

## 2019-12-02 NOTE — PROGRESS NOTES
Destiny Hinson    Dear Dr. Dina Dawkins    Thank you for requesting an ultrasound and maternal-fetal medicine consultation on your patient Jaren Beyer.   As you are aware she is a 37year old female  with a porter pregn Gayle  · Follow-up Growth ultrasound at 32 weeks. · Weekly NST's at 36 weeks. Thank you for allowing me to participate in the care of your patient. Please do not hesitate to contact me if additional questions or concerns arise. Darwin Beltran M.D

## 2019-12-05 ENCOUNTER — ROUTINE PRENATAL (OUTPATIENT)
Dept: OBGYN CLINIC | Facility: CLINIC | Age: 34
End: 2019-12-05
Payer: COMMERCIAL

## 2019-12-05 VITALS
WEIGHT: 159 LBS | DIASTOLIC BLOOD PRESSURE: 80 MMHG | SYSTOLIC BLOOD PRESSURE: 133 MMHG | HEART RATE: 92 BPM | BODY MASS INDEX: 26 KG/M2

## 2019-12-05 DIAGNOSIS — Z34.92 ENCOUNTER FOR SUPERVISION OF NORMAL PREGNANCY IN SECOND TRIMESTER, UNSPECIFIED GRAVIDITY: Primary | ICD-10-CM

## 2019-12-05 PROCEDURE — 81002 URINALYSIS NONAUTO W/O SCOPE: CPT | Performed by: OBSTETRICS & GYNECOLOGY

## 2019-12-05 PROCEDURE — 90715 TDAP VACCINE 7 YRS/> IM: CPT | Performed by: OBSTETRICS & GYNECOLOGY

## 2019-12-05 PROCEDURE — 90471 IMMUNIZATION ADMIN: CPT | Performed by: OBSTETRICS & GYNECOLOGY

## 2019-12-16 ENCOUNTER — ROUTINE PRENATAL (OUTPATIENT)
Dept: OBGYN CLINIC | Facility: CLINIC | Age: 34
End: 2019-12-16
Payer: COMMERCIAL

## 2019-12-16 VITALS
SYSTOLIC BLOOD PRESSURE: 108 MMHG | HEART RATE: 85 BPM | WEIGHT: 163 LBS | BODY MASS INDEX: 26 KG/M2 | DIASTOLIC BLOOD PRESSURE: 68 MMHG

## 2019-12-16 DIAGNOSIS — Z34.83 ENCOUNTER FOR SUPERVISION OF OTHER NORMAL PREGNANCY IN THIRD TRIMESTER: Primary | ICD-10-CM

## 2019-12-16 PROCEDURE — 81002 URINALYSIS NONAUTO W/O SCOPE: CPT | Performed by: OBSTETRICS & GYNECOLOGY

## 2019-12-30 ENCOUNTER — ROUTINE PRENATAL (OUTPATIENT)
Dept: OBGYN CLINIC | Facility: CLINIC | Age: 34
End: 2019-12-30
Payer: COMMERCIAL

## 2019-12-30 VITALS
DIASTOLIC BLOOD PRESSURE: 71 MMHG | WEIGHT: 165.19 LBS | BODY MASS INDEX: 27 KG/M2 | HEART RATE: 80 BPM | SYSTOLIC BLOOD PRESSURE: 109 MMHG

## 2019-12-30 DIAGNOSIS — Z34.93 ENCOUNTER FOR SUPERVISION OF NORMAL PREGNANCY IN THIRD TRIMESTER, UNSPECIFIED GRAVIDITY: Primary | ICD-10-CM

## 2019-12-30 PROCEDURE — 81002 URINALYSIS NONAUTO W/O SCOPE: CPT | Performed by: OBSTETRICS & GYNECOLOGY

## 2020-01-02 ENCOUNTER — HOSPITAL ENCOUNTER (OUTPATIENT)
Dept: PERINATAL CARE | Facility: HOSPITAL | Age: 35
Discharge: HOME | End: 2020-01-02
Attending: OBSTETRICS & GYNECOLOGY
Payer: COMMERCIAL

## 2020-01-02 VITALS
DIASTOLIC BLOOD PRESSURE: 76 MMHG | BODY MASS INDEX: 26.52 KG/M2 | SYSTOLIC BLOOD PRESSURE: 123 MMHG | WEIGHT: 165 LBS | HEIGHT: 66 IN | HEART RATE: 88 BPM

## 2020-01-02 DIAGNOSIS — O35.8XX0 SHORT FEMUR OF FETUS ON PRENATAL ULTRASOUND: ICD-10-CM

## 2020-01-02 DIAGNOSIS — O09.521 AMA (ADVANCED MATERNAL AGE) MULTIGRAVIDA 35+, FIRST TRIMESTER: ICD-10-CM

## 2020-01-02 DIAGNOSIS — O09.521 AMA (ADVANCED MATERNAL AGE) MULTIGRAVIDA 35+, FIRST TRIMESTER: Primary | ICD-10-CM

## 2020-01-02 PROCEDURE — 76816 OB US FOLLOW-UP PER FETUS: CPT | Performed by: OBSTETRICS & GYNECOLOGY

## 2020-01-02 PROCEDURE — 99214 OFFICE O/P EST MOD 30 MIN: CPT | Performed by: OBSTETRICS & GYNECOLOGY

## 2020-01-02 NOTE — PROGRESS NOTES
Marylu Person    Dear Dr. Maggie Guzman    Thank you for requesting an ultrasound and maternal-fetal medicine consultation on your patient Nora Orozco.   As you are aware she is a 38year old female  with a porter pregn neonatology evaluation after delivery due to short limbs  · Weekly NST's at 36 weeks. Thank you for allowing me to participate in the care of your patient. Please do not hesitate to contact me if additional questions or concerns arise.         The major

## 2020-01-15 ENCOUNTER — ROUTINE PRENATAL (OUTPATIENT)
Dept: OBGYN CLINIC | Facility: CLINIC | Age: 35
End: 2020-01-15
Payer: COMMERCIAL

## 2020-01-15 VITALS
WEIGHT: 168.63 LBS | SYSTOLIC BLOOD PRESSURE: 109 MMHG | BODY MASS INDEX: 27 KG/M2 | DIASTOLIC BLOOD PRESSURE: 68 MMHG | HEART RATE: 84 BPM

## 2020-01-15 DIAGNOSIS — Z34.93 ENCOUNTER FOR SUPERVISION OF NORMAL PREGNANCY IN THIRD TRIMESTER, UNSPECIFIED GRAVIDITY: Primary | ICD-10-CM

## 2020-01-15 LAB
MULTISTIX LOT#: NORMAL NUMERIC
PH, URINE: 6 (ref 4.5–8)
SPECIFIC GRAVITY: 1.01 (ref 1–1.03)
UROBILINOGEN,SEMI-QN: 0 MG/DL (ref 0–1.9)

## 2020-01-15 PROCEDURE — 81002 URINALYSIS NONAUTO W/O SCOPE: CPT | Performed by: OBSTETRICS & GYNECOLOGY

## 2020-01-15 NOTE — PROGRESS NOTES
No issues. Short humerus and femur. Normal growth overall. NSTs ordered. Has 3T bloodwork. Declines tubal if CS necessary. RTC 2 wks.

## 2020-01-23 ENCOUNTER — LAB ENCOUNTER (OUTPATIENT)
Dept: LAB | Facility: HOSPITAL | Age: 35
End: 2020-01-23
Attending: OBSTETRICS & GYNECOLOGY
Payer: COMMERCIAL

## 2020-01-23 DIAGNOSIS — Z34.93 ENCOUNTER FOR SUPERVISION OF NORMAL PREGNANCY IN THIRD TRIMESTER, UNSPECIFIED GRAVIDITY: ICD-10-CM

## 2020-01-23 LAB
DEPRECATED RDW RBC AUTO: 39.6 FL (ref 35.1–46.3)
ERYTHROCYTE [DISTWIDTH] IN BLOOD BY AUTOMATED COUNT: 12.2 % (ref 11–15)
HCT VFR BLD AUTO: 34 % (ref 35–48)
HGB BLD-MCNC: 11.1 G/DL (ref 12–16)
MCH RBC QN AUTO: 29.2 PG (ref 26–34)
MCHC RBC AUTO-ENTMCNC: 32.6 G/DL (ref 31–37)
MCV RBC AUTO: 89.5 FL (ref 80–100)
PLATELET # BLD AUTO: 206 10(3)UL (ref 150–450)
RBC # BLD AUTO: 3.8 X10(6)UL (ref 3.8–5.3)
WBC # BLD AUTO: 7.2 X10(3) UL (ref 4–11)

## 2020-01-23 PROCEDURE — 87389 HIV-1 AG W/HIV-1&-2 AB AG IA: CPT

## 2020-01-23 PROCEDURE — 85027 COMPLETE CBC AUTOMATED: CPT

## 2020-01-23 PROCEDURE — 36415 COLL VENOUS BLD VENIPUNCTURE: CPT

## 2020-01-23 PROCEDURE — 86780 TREPONEMA PALLIDUM: CPT

## 2020-01-24 LAB — T PALLIDUM AB SER QL: NEGATIVE

## 2020-01-27 ENCOUNTER — HOSPITAL ENCOUNTER (OUTPATIENT)
Facility: HOSPITAL | Age: 35
Discharge: HOME | End: 2020-01-27
Attending: OBSTETRICS & GYNECOLOGY | Admitting: OBSTETRICS & GYNECOLOGY
Payer: COMMERCIAL

## 2020-01-27 ENCOUNTER — TELEPHONE (OUTPATIENT)
Dept: OBGYN UNIT | Facility: HOSPITAL | Age: 35
End: 2020-01-27

## 2020-01-27 ENCOUNTER — APPOINTMENT (OUTPATIENT)
Dept: OBGYN CLINIC | Facility: HOSPITAL | Age: 35
End: 2020-01-27
Payer: COMMERCIAL

## 2020-01-27 PROCEDURE — 59025 FETAL NON-STRESS TEST: CPT | Performed by: OBSTETRICS & GYNECOLOGY

## 2020-01-28 NOTE — PROGRESS NOTES
Pt is a 38year old female admitted to TR2/TR2-A. Patient presents with:  Non-stress Test: AMA     Pt is  36w1d intra-uterine pregnancy. History obtained, consents signed. Oriented to room, staff, and plan of care.

## 2020-01-28 NOTE — TRIAGE
[facility]      Triage Note    [facility] EMERGENCY [facility] Patient Status:  Outpatient in a Bed    1985 MRN [id number]   Location [address]  Attending Monse Rizzo, [facility] Roseann Gonzalez Day # 0 PCP Liam Albrecht MD

## 2020-01-29 ENCOUNTER — ROUTINE PRENATAL (OUTPATIENT)
Dept: OBGYN CLINIC | Facility: CLINIC | Age: 35
End: 2020-01-29
Payer: COMMERCIAL

## 2020-01-29 VITALS
WEIGHT: 173 LBS | BODY MASS INDEX: 28 KG/M2 | DIASTOLIC BLOOD PRESSURE: 69 MMHG | SYSTOLIC BLOOD PRESSURE: 112 MMHG | HEART RATE: 87 BPM

## 2020-01-29 DIAGNOSIS — Z34.83 ENCOUNTER FOR SUPERVISION OF OTHER NORMAL PREGNANCY IN THIRD TRIMESTER: Primary | ICD-10-CM

## 2020-01-29 LAB
APPEARANCE: CLEAR
MULTISTIX LOT#: NORMAL NUMERIC
PH, URINE: 6 (ref 4.5–8)
SPECIFIC GRAVITY: 1.01 (ref 1–1.03)
URINE-COLOR: YELLOW
UROBILINOGEN,SEMI-QN: 0.2 MG/DL (ref 0–1.9)

## 2020-01-29 PROCEDURE — 81002 URINALYSIS NONAUTO W/O SCOPE: CPT | Performed by: OBSTETRICS & GYNECOLOGY

## 2020-02-02 PROBLEM — Z87.42 HISTORY OF DYSPAREUNIA IN FEMALE: Status: ACTIVE | Noted: 2020-02-02

## 2020-02-03 ENCOUNTER — APPOINTMENT (OUTPATIENT)
Dept: OBGYN CLINIC | Facility: HOSPITAL | Age: 35
End: 2020-02-03
Payer: COMMERCIAL

## 2020-02-03 ENCOUNTER — HOSPITAL ENCOUNTER (OUTPATIENT)
Facility: HOSPITAL | Age: 35
Discharge: HOME | End: 2020-02-03
Attending: OBSTETRICS & GYNECOLOGY | Admitting: OBSTETRICS & GYNECOLOGY
Payer: COMMERCIAL

## 2020-02-03 VITALS
SYSTOLIC BLOOD PRESSURE: 125 MMHG | TEMPERATURE: 98 F | RESPIRATION RATE: 18 BRPM | HEART RATE: 91 BPM | DIASTOLIC BLOOD PRESSURE: 84 MMHG

## 2020-02-03 DIAGNOSIS — O09.529 AMA (ADVANCED MATERNAL AGE) MULTIGRAVIDA 35+: ICD-10-CM

## 2020-02-03 PROCEDURE — 59025 FETAL NON-STRESS TEST: CPT | Performed by: OBSTETRICS & GYNECOLOGY

## 2020-02-03 NOTE — PROGRESS NOTES
No S/S of PTL. Discussed concerns about history of entry dyspareunia after first delivery.  See problem list.

## 2020-02-04 NOTE — NST
Nonstress Test   Patient: Jaren Beyer    Gestation: 37w1d    NST:       Variability: Moderate           Accelerations: Yes           Decelerations: None            Baseline: 125 BPM           Uterine Irritability: No           Contractions: Not pres

## 2020-02-05 ENCOUNTER — ROUTINE PRENATAL (OUTPATIENT)
Dept: OBGYN CLINIC | Facility: CLINIC | Age: 35
End: 2020-02-05
Payer: COMMERCIAL

## 2020-02-05 VITALS
WEIGHT: 172.38 LBS | HEART RATE: 84 BPM | SYSTOLIC BLOOD PRESSURE: 115 MMHG | DIASTOLIC BLOOD PRESSURE: 68 MMHG | BODY MASS INDEX: 28 KG/M2

## 2020-02-05 DIAGNOSIS — Z34.83 ENCOUNTER FOR SUPERVISION OF OTHER NORMAL PREGNANCY IN THIRD TRIMESTER: Primary | ICD-10-CM

## 2020-02-05 LAB
APPEARANCE: CLEAR
MULTISTIX LOT#: NORMAL NUMERIC
PH, URINE: 6.5 (ref 4.5–8)
SPECIFIC GRAVITY: 1 (ref 1–1.03)
URINE-COLOR: YELLOW
UROBILINOGEN,SEMI-QN: 0.2 MG/DL (ref 0–1.9)

## 2020-02-05 PROCEDURE — 81002 URINALYSIS NONAUTO W/O SCOPE: CPT | Performed by: OBSTETRICS & GYNECOLOGY

## 2020-02-11 ENCOUNTER — APPOINTMENT (OUTPATIENT)
Dept: OBGYN CLINIC | Facility: HOSPITAL | Age: 35
End: 2020-02-11
Payer: COMMERCIAL

## 2020-02-11 ENCOUNTER — HOSPITAL ENCOUNTER (OUTPATIENT)
Facility: HOSPITAL | Age: 35
Discharge: HOME | End: 2020-02-11
Attending: OBSTETRICS & GYNECOLOGY | Admitting: OBSTETRICS & GYNECOLOGY
Payer: COMMERCIAL

## 2020-02-11 VITALS
BODY MASS INDEX: 27.64 KG/M2 | HEIGHT: 66 IN | SYSTOLIC BLOOD PRESSURE: 115 MMHG | TEMPERATURE: 98 F | DIASTOLIC BLOOD PRESSURE: 77 MMHG | WEIGHT: 172 LBS | HEART RATE: 76 BPM

## 2020-02-11 PROBLEM — Z34.90 PREGNANCY: Status: ACTIVE | Noted: 2020-02-11

## 2020-02-11 PROBLEM — Z34.90 PREGNANCY (HCC): Status: ACTIVE | Noted: 2020-02-11

## 2020-02-11 PROCEDURE — 59025 FETAL NON-STRESS TEST: CPT | Performed by: OBSTETRICS & GYNECOLOGY

## 2020-02-12 NOTE — TRIAGE
[place] [facility] - [facility]      Triage Note    [facility] Patient Status:  Outpatient in a Bed    1985 MRN [id number]   Location [address]  Attending [address] [place] [address], [address] [place] [address] Day # 0 PCP Rosie Garcia MD

## 2020-02-12 NOTE — PROGRESS NOTES
RICHARD ELIZABETH. 38YEAR OLD  38 2/7 WEEKS GESTATION    PRESENTS TO TRIAGE FOR SCHEDULED NST DUE TO AMA. POC REVIEWED, CONSENTS OBTAINED, EFM APPLIED.

## 2020-02-14 ENCOUNTER — ROUTINE PRENATAL (OUTPATIENT)
Dept: OBGYN CLINIC | Facility: CLINIC | Age: 35
End: 2020-02-14
Payer: COMMERCIAL

## 2020-02-14 VITALS
DIASTOLIC BLOOD PRESSURE: 76 MMHG | BODY MASS INDEX: 28 KG/M2 | SYSTOLIC BLOOD PRESSURE: 119 MMHG | HEART RATE: 69 BPM | WEIGHT: 173 LBS

## 2020-02-14 DIAGNOSIS — Z34.83 ENCOUNTER FOR SUPERVISION OF OTHER NORMAL PREGNANCY IN THIRD TRIMESTER: Primary | ICD-10-CM

## 2020-02-14 LAB
APPEARANCE: CLEAR
MULTISTIX LOT#: NORMAL NUMERIC
URINE-COLOR: YELLOW

## 2020-02-14 PROCEDURE — 81002 URINALYSIS NONAUTO W/O SCOPE: CPT | Performed by: OBSTETRICS & GYNECOLOGY

## 2020-02-17 ENCOUNTER — APPOINTMENT (OUTPATIENT)
Dept: OBGYN CLINIC | Facility: HOSPITAL | Age: 35
End: 2020-02-17
Payer: COMMERCIAL

## 2020-02-17 ENCOUNTER — HOSPITAL ENCOUNTER (OUTPATIENT)
Facility: HOSPITAL | Age: 35
Discharge: HOME | End: 2020-02-17
Attending: OBSTETRICS & GYNECOLOGY | Admitting: OBSTETRICS & GYNECOLOGY
Payer: COMMERCIAL

## 2020-02-17 VITALS
RESPIRATION RATE: 16 BRPM | SYSTOLIC BLOOD PRESSURE: 122 MMHG | TEMPERATURE: 98 F | HEART RATE: 82 BPM | DIASTOLIC BLOOD PRESSURE: 76 MMHG

## 2020-02-17 PROCEDURE — 59025 FETAL NON-STRESS TEST: CPT | Performed by: OBSTETRICS & GYNECOLOGY

## 2020-02-18 ENCOUNTER — OFFICE VISIT (OUTPATIENT)
Dept: INTEGRATIVE MEDICINE | Facility: CLINIC | Age: 35
End: 2020-02-18

## 2020-02-18 DIAGNOSIS — M54.50 LOW BACK PAIN, UNSPECIFIED BACK PAIN LATERALITY, UNSPECIFIED CHRONICITY, UNSPECIFIED WHETHER SCIATICA PRESENT: Primary | ICD-10-CM

## 2020-02-18 NOTE — PROGRESS NOTES
Pt is a 38year old female admitted to TR1/TR1-A. Patient presents with:  Non-stress Test     Pt is  39w1d intra-uterine pregnancy.

## 2020-02-18 NOTE — NST
Nonstress Test   Patient: Steve Allen    Gestation: 39w1d    NST:       Variability: Moderate           Accelerations: Yes           Decelerations: None            Baseline: 130 BPM           Uterine Irritability: No           Contractions: Not pres

## 2020-02-18 NOTE — PROGRESS NOTES
Lyla Honeycutt is a 38year old female Acupuncture Therapy. Complaints:  1. Preparation for labor  2. Low back pain 2/10    Initial Consult: Patient 39 weeks pregnant and has mild low back pain. Has mild nausea in the morning.   Has noticed slight sw

## 2020-02-19 ENCOUNTER — ROUTINE PRENATAL (OUTPATIENT)
Dept: OBGYN CLINIC | Facility: CLINIC | Age: 35
End: 2020-02-19
Payer: COMMERCIAL

## 2020-02-19 ENCOUNTER — OFFICE VISIT (OUTPATIENT)
Dept: INTEGRATIVE MEDICINE | Facility: CLINIC | Age: 35
End: 2020-02-19

## 2020-02-19 VITALS
SYSTOLIC BLOOD PRESSURE: 110 MMHG | BODY MASS INDEX: 28 KG/M2 | DIASTOLIC BLOOD PRESSURE: 71 MMHG | HEART RATE: 89 BPM | WEIGHT: 176 LBS

## 2020-02-19 DIAGNOSIS — M54.50 ACUTE MIDLINE LOW BACK PAIN WITHOUT SCIATICA: ICD-10-CM

## 2020-02-19 DIAGNOSIS — Z34.83 ENCOUNTER FOR SUPERVISION OF OTHER NORMAL PREGNANCY IN THIRD TRIMESTER: Primary | ICD-10-CM

## 2020-02-19 LAB
APPEARANCE: CLEAR
MULTISTIX LOT#: ABNORMAL NUMERIC
PH, URINE: 6 (ref 4.5–8)
SPECIFIC GRAVITY: 1 (ref 1–1.03)
URINE-COLOR: YELLOW

## 2020-02-19 PROCEDURE — 81002 URINALYSIS NONAUTO W/O SCOPE: CPT | Performed by: OBSTETRICS & GYNECOLOGY

## 2020-02-20 ENCOUNTER — OFFICE VISIT (OUTPATIENT)
Dept: INTEGRATIVE MEDICINE | Facility: CLINIC | Age: 35
End: 2020-02-20

## 2020-02-20 DIAGNOSIS — Z3A.28 28 WEEKS GESTATION OF PREGNANCY: ICD-10-CM

## 2020-02-20 NOTE — PROGRESS NOTES
AVNI Pool, WILLA Guido came today to get help in her pregnancy.   Tongue: pale coat with scallops and swollen.     Pulse DELMIS Weak and deep as well as the SP KD deep and weak            HT weak and deficient, LV tight, KD deep and weak

## 2020-02-21 ENCOUNTER — OFFICE VISIT (OUTPATIENT)
Dept: INTEGRATIVE MEDICINE | Facility: CLINIC | Age: 35
End: 2020-02-21

## 2020-02-21 DIAGNOSIS — M54.50 LOW BACK PAIN, UNSPECIFIED BACK PAIN LATERALITY, UNSPECIFIED CHRONICITY, UNSPECIFIED WHETHER SCIATICA PRESENT: Primary | ICD-10-CM

## 2020-02-21 NOTE — PROGRESS NOTES
Albaro Portillo is a 38year old female Acupuncture Therapy. Complaints:  1. Preparation for labor  2. Low back pain 2/10    Initial Consult: Patient 39 weeks pregnant and has mild low back pain. Has mild nausea in the morning.   Has noticed slight sw

## 2020-02-21 NOTE — PROGRESS NOTES
Lyla Honeycutt is a 38year old female Acupuncture Therapy. Complaints:  1. Preparation for labor  2. Low back pain 1/10     Initial Consult: Patient 39 weeks pregnant and has mild low back pain. Has mild nausea in the morning.   Has noticed slight s

## 2020-02-22 ENCOUNTER — HOSPITAL ENCOUNTER (INPATIENT)
Facility: HOSPITAL | Age: 35
LOS: 3 days | Discharge: HOME | End: 2020-02-25
Attending: OBSTETRICS & GYNECOLOGY | Admitting: OBSTETRICS & GYNECOLOGY
Payer: COMMERCIAL

## 2020-02-22 LAB
DEPRECATED RDW RBC AUTO: 43.7 FL (ref 35.1–46.3)
ERYTHROCYTE [DISTWIDTH] IN BLOOD BY AUTOMATED COUNT: 12.7 % (ref 11–15)
HCT VFR BLD AUTO: 37.5 % (ref 35–48)
HGB BLD-MCNC: 11.6 G/DL (ref 12–16)
MCH RBC QN AUTO: 29.2 PG (ref 26–34)
MCHC RBC AUTO-ENTMCNC: 30.9 G/DL (ref 31–37)
MCV RBC AUTO: 94.5 FL (ref 80–100)
PLATELET # BLD AUTO: 154 10(3)UL (ref 150–450)
RBC # BLD AUTO: 3.97 X10(6)UL (ref 3.8–5.3)
WBC # BLD AUTO: 7.7 X10(3) UL (ref 4–11)

## 2020-02-22 RX ORDER — DEXTROSE, SODIUM CHLORIDE, SODIUM LACTATE, POTASSIUM CHLORIDE, AND CALCIUM CHLORIDE 5; .6; .31; .03; .02 G/100ML; G/100ML; G/100ML; G/100ML; G/100ML
INJECTION, SOLUTION INTRAVENOUS CONTINUOUS
Status: DISCONTINUED | OUTPATIENT
Start: 2020-02-22 | End: 2020-02-23 | Stop reason: HOSPADM

## 2020-02-22 RX ORDER — IBUPROFEN 600 MG/1
600 TABLET ORAL ONCE AS NEEDED
Status: DISCONTINUED | OUTPATIENT
Start: 2020-02-22 | End: 2020-02-23 | Stop reason: HOSPADM

## 2020-02-22 RX ORDER — LIDOCAINE HYDROCHLORIDE 10 MG/ML
30 INJECTION, SOLUTION EPIDURAL; INFILTRATION; INTRACAUDAL; PERINEURAL ONCE
Status: DISCONTINUED | OUTPATIENT
Start: 2020-02-22 | End: 2020-02-23 | Stop reason: HOSPADM

## 2020-02-22 RX ORDER — SODIUM CHLORIDE 0.9 % (FLUSH) 0.9 %
10 SYRINGE (ML) INJECTION AS NEEDED
Status: DISCONTINUED | OUTPATIENT
Start: 2020-02-22 | End: 2020-02-23 | Stop reason: HOSPADM

## 2020-02-22 RX ORDER — AMMONIA INHALANTS 0.04 G/.3ML
0.3 INHALANT RESPIRATORY (INHALATION) AS NEEDED
Status: DISCONTINUED | OUTPATIENT
Start: 2020-02-22 | End: 2020-02-23 | Stop reason: HOSPADM

## 2020-02-22 RX ORDER — ACETAMINOPHEN 500 MG
500 TABLET ORAL ONCE AS NEEDED
Status: DISCONTINUED | OUTPATIENT
Start: 2020-02-22 | End: 2020-02-23 | Stop reason: HOSPADM

## 2020-02-22 RX ORDER — TERBUTALINE SULFATE 1 MG/ML
0.25 INJECTION, SOLUTION SUBCUTANEOUS AS NEEDED
Status: DISCONTINUED | OUTPATIENT
Start: 2020-02-22 | End: 2020-02-23 | Stop reason: HOSPADM

## 2020-02-22 RX ORDER — TRISODIUM CITRATE DIHYDRATE AND CITRIC ACID MONOHYDRATE 500; 334 MG/5ML; MG/5ML
30 SOLUTION ORAL AS NEEDED
Status: DISCONTINUED | OUTPATIENT
Start: 2020-02-22 | End: 2020-02-23 | Stop reason: HOSPADM

## 2020-02-23 ENCOUNTER — ANESTHESIA (OUTPATIENT)
Dept: OBGYN UNIT | Facility: HOSPITAL | Age: 35
End: 2020-02-23
Payer: COMMERCIAL

## 2020-02-23 ENCOUNTER — ANESTHESIA EVENT (OUTPATIENT)
Dept: OBGYN UNIT | Facility: HOSPITAL | Age: 35
End: 2020-02-23
Payer: COMMERCIAL

## 2020-02-23 LAB
ANTIBODY SCREEN: NEGATIVE
RH BLOOD TYPE: POSITIVE

## 2020-02-23 PROCEDURE — 0HQ9XZZ REPAIR PERINEUM SKIN, EXTERNAL APPROACH: ICD-10-PCS | Performed by: OBSTETRICS & GYNECOLOGY

## 2020-02-23 PROCEDURE — 0UQMXZZ REPAIR VULVA, EXTERNAL APPROACH: ICD-10-PCS | Performed by: OBSTETRICS & GYNECOLOGY

## 2020-02-23 PROCEDURE — 59400 OBSTETRICAL CARE: CPT | Performed by: OBSTETRICS & GYNECOLOGY

## 2020-02-23 RX ORDER — SODIUM CHLORIDE 0.9 % (FLUSH) 0.9 %
10 SYRINGE (ML) INJECTION AS NEEDED
Status: DISCONTINUED | OUTPATIENT
Start: 2020-02-23 | End: 2020-02-25

## 2020-02-23 RX ORDER — DIAPER,BRIEF,INFANT-TODD,DISP
1 EACH MISCELLANEOUS EVERY 6 HOURS PRN
Status: DISCONTINUED | OUTPATIENT
Start: 2020-02-23 | End: 2020-02-25

## 2020-02-23 RX ORDER — BUPIVACAINE HYDROCHLORIDE 2.5 MG/ML
INJECTION, SOLUTION EPIDURAL; INFILTRATION; INTRACAUDAL AS NEEDED
Status: DISCONTINUED | OUTPATIENT
Start: 2020-02-23 | End: 2020-02-23 | Stop reason: SURG

## 2020-02-23 RX ORDER — IBUPROFEN 600 MG/1
600 TABLET ORAL EVERY 6 HOURS
Status: DISCONTINUED | OUTPATIENT
Start: 2020-02-23 | End: 2020-02-25

## 2020-02-23 RX ORDER — ONDANSETRON 2 MG/ML
4 INJECTION INTRAMUSCULAR; INTRAVENOUS EVERY 6 HOURS PRN
Status: DISCONTINUED | OUTPATIENT
Start: 2020-02-23 | End: 2020-02-25

## 2020-02-23 RX ORDER — DOCUSATE SODIUM 100 MG/1
100 CAPSULE, LIQUID FILLED ORAL 2 TIMES DAILY
Status: DISCONTINUED | OUTPATIENT
Start: 2020-02-23 | End: 2020-02-25

## 2020-02-23 RX ORDER — DIPHENHYDRAMINE HYDROCHLORIDE 50 MG/ML
12.5 INJECTION INTRAMUSCULAR; INTRAVENOUS EVERY 4 HOURS PRN
Status: DISCONTINUED | OUTPATIENT
Start: 2020-02-23 | End: 2020-02-23

## 2020-02-23 RX ORDER — LIDOCAINE HYDROCHLORIDE AND EPINEPHRINE 15; 5 MG/ML; UG/ML
INJECTION, SOLUTION EPIDURAL AS NEEDED
Status: DISCONTINUED | OUTPATIENT
Start: 2020-02-23 | End: 2020-02-23 | Stop reason: SURG

## 2020-02-23 RX ORDER — BISACODYL 10 MG
10 SUPPOSITORY, RECTAL RECTAL ONCE AS NEEDED
Status: DISCONTINUED | OUTPATIENT
Start: 2020-02-23 | End: 2020-02-25

## 2020-02-23 RX ORDER — SIMETHICONE 80 MG
80 TABLET,CHEWABLE ORAL 3 TIMES DAILY PRN
Status: DISCONTINUED | OUTPATIENT
Start: 2020-02-23 | End: 2020-02-25

## 2020-02-23 RX ORDER — BUPIVACAINE HYDROCHLORIDE 2.5 MG/ML
INJECTION, SOLUTION EPIDURAL; INFILTRATION; INTRACAUDAL
Status: DISPENSED
Start: 2020-02-23 | End: 2020-02-23

## 2020-02-23 RX ORDER — EPHEDRINE SULFATE/0.9% NACL/PF 25 MG/5 ML
5 SYRINGE (ML) INTRAVENOUS AS NEEDED
Status: DISCONTINUED | OUTPATIENT
Start: 2020-02-23 | End: 2020-02-23

## 2020-02-23 RX ORDER — CHOLECALCIFEROL (VITAMIN D3) 25 MCG
1 TABLET,CHEWABLE ORAL DAILY
Status: DISCONTINUED | OUTPATIENT
Start: 2020-02-23 | End: 2020-02-25

## 2020-02-23 RX ORDER — ACETAMINOPHEN 325 MG/1
650 TABLET ORAL EVERY 6 HOURS PRN
Status: DISCONTINUED | OUTPATIENT
Start: 2020-02-23 | End: 2020-02-25

## 2020-02-23 RX ORDER — LIDOCAINE HYDROCHLORIDE 10 MG/ML
INJECTION, SOLUTION EPIDURAL; INFILTRATION; INTRACAUDAL; PERINEURAL AS NEEDED
Status: DISCONTINUED | OUTPATIENT
Start: 2020-02-23 | End: 2020-02-23 | Stop reason: SURG

## 2020-02-23 RX ORDER — AMMONIA INHALANTS 0.04 G/.3ML
0.3 INHALANT RESPIRATORY (INHALATION) AS NEEDED
Status: DISCONTINUED | OUTPATIENT
Start: 2020-02-23 | End: 2020-02-25

## 2020-02-23 RX ORDER — BUPIVACAINE HYDROCHLORIDE 2.5 MG/ML
15 INJECTION, SOLUTION EPIDURAL; INFILTRATION; INTRACAUDAL ONCE
Status: DISCONTINUED | OUTPATIENT
Start: 2020-02-23 | End: 2020-02-23

## 2020-02-23 RX ADMIN — LIDOCAINE HYDROCHLORIDE AND EPINEPHRINE 3 ML: 15; 5 INJECTION, SOLUTION EPIDURAL at 09:11:00

## 2020-02-23 RX ADMIN — LIDOCAINE HYDROCHLORIDE 3 ML: 10 INJECTION, SOLUTION EPIDURAL; INFILTRATION; INTRACAUDAL; PERINEURAL at 09:07:00

## 2020-02-23 RX ADMIN — BUPIVACAINE HYDROCHLORIDE 2 MG: 2.5 INJECTION, SOLUTION EPIDURAL; INFILTRATION; INTRACAUDAL at 09:10:00

## 2020-02-23 NOTE — ANESTHESIA PROCEDURE NOTES
Labor Analgesia  Performed by: Geovani Mackenzie DO  Authorized by: Geovani Mackenzie DO       General Information and Staff    Start Time:   Anesthesiologist: Geovani Mackenzie DO  Patient Location:  OB  Reason for Block: labor epidural    Preanesthetic Checklist:

## 2020-02-23 NOTE — ANESTHESIA PREPROCEDURE EVALUATION
Anesthesia PreOp Note    HPI:     Jarad Benjamin is a 38year old female who presents for preoperative consultation requested by: * No surgeons listed *    Date of Surgery: 2/23/2020    * No procedures listed *  Indication: * No pre-op diagnosis entere Hardeep Vee,   diphenhydrAMINE HCl (BENADRYL) injection 12.5 mg, 12.5 mg, Intravenous, Q4H PRN, Avinash Arboleda,   bupivacaine PF (MARCAINE) 0.25% injection, 15 mL, Epidural, Once, Avinash Arboleda,   bupivacaine (PF) (MARCAINE) 0.25 % injection, , , , Worry: Not on file        Inability: Not on file      Transportation needs:        Medical: Not on file        Non-medical: Not on file    Tobacco Use      Smoking status: Never Smoker      Smokeless tobacco: Never Used    Substance and Sexual Activit Hx of Radiation Treatments: Not Asked        Regular use of sun block: Not Asked    Social History Narrative      Not on file      Available pre-op labs reviewed.   Lab Results   Component Value Date    WBC 7.7 02/22/2020    RBC 3.97 02/22/2020    HGB 11 patient desires the anesthetic management as planned.   Becka Villar  2/23/2020 9:23 AM

## 2020-02-23 NOTE — PROGRESS NOTES
Pt received into room 366. Pt transferred via wheelchair. Report received from Francesca Lauren, [address] [place] [address]. Assessment and vitals WNL. Pt oriented to room, bed in lowest position, locked, siderails up x2, call light within reach. POC reviewed.

## 2020-02-23 NOTE — ANESTHESIA POSTPROCEDURE EVALUATION
Patient: Bruce Luther [facility] - OVERLOOK    Procedure Summary     Date:  02/23/20 Room / Location:      Anesthesia Start:  0900 Anesthesia Stop:  6075    Procedure:  LABOR ANALGESIA Diagnosis:      Scheduled Providers:   Anesthesiologist:  DO Cedric Wright

## 2020-02-23 NOTE — H&P
[facility] Patient Status:  Inpatient    1985 MRN [id number]   Location [address]  Attending Anne Lehman MD   Hosp Day # 1 PCP Viridiana Clark MD     Date of tobacco: Never Used    Alcohol use: Not Currently      Alcohol/week: 0.0 standard drinks      Comment: Socially      Family History:  No pertinent family history  Allergies/Medications:    Allergies:   No Known Allergies  Medications:  prenatal multivitamin

## 2020-02-23 NOTE — DISCHARGE SUMMARY
[place] [facility] - [facility]    Discharge Summary    Dee Paz Patient Status:  Inpatient    1985 MRN [id number]   Location [address]  Attending Annamaria Dance, MD   [facility] Day # 3       Delivering OB Clinician:

## 2020-02-23 NOTE — PROGRESS NOTES
PT PRESENTED TO TR 3 WITH C/O LOF SINCE 1930. PT DENIES VAG BLEEDING AND STATES + FETAL MOVEMENTS AT THIS TIME.  PT ORIENTED TO ROOM/CALL LIGHT/POC-PT VERBALIZES UNDERSTANDING

## 2020-02-23 NOTE — L&D DELIVERY NOTE
[facility] - [facility]    Vaginal Delivery Note    [facility] Patient Status:  Inpatient    1985 MRN [id number]   Location [address] [facility] Attending Abdon Myrick MD   Hosp Day # 1 PCP Zhou Alexis MD     [facility]

## 2020-02-24 LAB
BASOPHILS # BLD AUTO: 0.03 X10(3) UL (ref 0–0.2)
BASOPHILS NFR BLD AUTO: 0.3 %
DEPRECATED RDW RBC AUTO: 41.7 FL (ref 35.1–46.3)
EOSINOPHIL # BLD AUTO: 0.06 X10(3) UL (ref 0–0.7)
EOSINOPHIL NFR BLD AUTO: 0.6 %
ERYTHROCYTE [DISTWIDTH] IN BLOOD BY AUTOMATED COUNT: 13 % (ref 11–15)
HCT VFR BLD AUTO: 25.7 % (ref 35–48)
HGB BLD-MCNC: 8.4 G/DL (ref 12–16)
IMM GRANULOCYTES # BLD AUTO: 0.05 X10(3) UL (ref 0–1)
IMM GRANULOCYTES NFR BLD: 0.5 %
LYMPHOCYTES # BLD AUTO: 1.64 X10(3) UL (ref 1–4)
LYMPHOCYTES NFR BLD AUTO: 17.7 %
MCH RBC QN AUTO: 29.4 PG (ref 26–34)
MCHC RBC AUTO-ENTMCNC: 32.7 G/DL (ref 31–37)
MCV RBC AUTO: 89.9 FL (ref 80–100)
MONOCYTES # BLD AUTO: 0.57 X10(3) UL (ref 0.1–1)
MONOCYTES NFR BLD AUTO: 6.1 %
NEUTROPHILS # BLD AUTO: 6.93 X10 (3) UL (ref 1.5–7.7)
NEUTROPHILS # BLD AUTO: 6.93 X10(3) UL (ref 1.5–7.7)
NEUTROPHILS NFR BLD AUTO: 74.8 %
PLATELET # BLD AUTO: 174 10(3)UL (ref 150–450)
RBC # BLD AUTO: 2.86 X10(6)UL (ref 3.8–5.3)
WBC # BLD AUTO: 9.3 X10(3) UL (ref 4–11)

## 2020-02-24 RX ORDER — IBUPROFEN 600 MG/1
600 TABLET ORAL EVERY 6 HOURS
Qty: 60 TABLET | Refills: 0 | Status: SHIPPED | OUTPATIENT
Start: 2020-02-24 | End: 2021-07-20

## 2020-02-24 NOTE — PLAN OF CARE
Lenoard Leyden will continue to breastfeed q2-3 hours. Pain will be well managed with motrin. Vitals will remain wnl. Labs in am.  Pt requestiing 24 hour discharge.

## 2020-02-24 NOTE — PLAN OF CARE
Problem: PAIN - ADULT  Goal: Verbalizes/displays adequate comfort level or patient's stated pain goal  Description  INTERVENTIONS:  - Encourage pt to monitor pain and request assistance  - Assess pain using appropriate pain scale  - Administer analgesics delivery of care  - Encourage patient/family to participate in care and decision-making at the level they choose  - Honor patient and family perspectives and choices   Outcome: Progressing     Problem: POSTPARTUM  Goal: Long Term Goal:Experiences normal po benefits of breast feeding and how to manage common lactation challenges. - Recommend avoidance of specific medications or substances incompatible with breast feeding.  - Assess and monitor for signs of nipple pain/trauma.   - Instruct and provide assistan

## 2020-02-24 NOTE — PLAN OF CARE
Notified Dr Grecia Alva, verbal given to Franklin Feliciano that patient unable to have 24 hour discharge due to infant HIR bili. Will need to be rounded on tomorrow.

## 2020-02-24 NOTE — PROGRESS NOTES
Post-Partum Note   2/24/2020, 7:05 AM    Subjective:  Patient doing well. Pain is well controlled. She is ambulating without lightheadedness or dizziness. Denies fevers or chills. Denies SOB, CP. She is feeling well.  She is a little sore but feels good ove

## 2020-02-25 VITALS
DIASTOLIC BLOOD PRESSURE: 76 MMHG | RESPIRATION RATE: 16 BRPM | TEMPERATURE: 98 F | OXYGEN SATURATION: 100 % | HEART RATE: 88 BPM | SYSTOLIC BLOOD PRESSURE: 131 MMHG

## 2020-02-25 NOTE — PLAN OF CARE
Problem: PAIN - ADULT  Goal: Verbalizes/displays adequate comfort level or patient's stated pain goal  Description  INTERVENTIONS:  - Encourage pt to monitor pain and request assistance  - Assess pain using appropriate pain scale  - Administer analgesics participate in care and decision-making at the level they choose  - Honor patient and family perspectives and choices   Outcome: Completed     Problem: POSTPARTUM  Goal: Long Term Goal:Experiences normal postpartum course  Description  INTERVENTIONS:  - As challenges. - Recommend avoidance of specific medications or substances incompatible with breast feeding.  - Assess and monitor for signs of nipple pain/trauma. - Instruct and provide assistance with proper latch.   - Review techniques for milk expression

## 2020-02-25 NOTE — LACTATION NOTE
LACTATION NOTE - MOTHER      Evaluation Type: Inpatient    Problems identified  Problems identified: Knowledge deficit    Maternal history  Maternal history: AMA; Anemia    Breastfeeding goal  Breastfeeding goal: To maintain breast milk feeding per patient

## 2020-02-27 ENCOUNTER — TELEPHONE (OUTPATIENT)
Dept: OBGYN UNIT | Facility: HOSPITAL | Age: 35
End: 2020-02-27

## 2020-03-30 ENCOUNTER — TELEPHONE (OUTPATIENT)
Dept: OBGYN CLINIC | Facility: CLINIC | Age: 35
End: 2020-03-30

## 2020-03-30 NOTE — TELEPHONE ENCOUNTER
Pt baby delivered by Dr Sheridan Sarmiento, pt does not want to go to [facility] for post partum wanted to go to  HND with Dr Marlon Cook. When she takes her . Pt said Dr He Lo said ok when he discharged her.  I did offer pt ADO with Dr Sheridan Sarmiento

## 2020-03-30 NOTE — TELEPHONE ENCOUNTER
Pt delivered with Barry Tucker, and does not want her postpartum with [facility] at [facility] d/t Covid 19. Pt has to get her baby get vaccinations, but it will be pass her time to have her postpartum appt.   She has wanted to go to [facility] for appt for her baby and hers

## 2020-03-31 NOTE — TELEPHONE ENCOUNTER
Patient needs to have a Postpartum appointment. I am ok doing this over the phone. Please place her on a phone visit day that I am doing the phone calls.

## 2020-04-01 NOTE — TELEPHONE ENCOUNTER
Pt informed of KCBs recs below and verbalized understanding. Pt accepted PP PC appt with [facility] on 4/9.

## 2020-04-09 ENCOUNTER — VIRTUAL PHONE E/M (OUTPATIENT)
Dept: OBGYN CLINIC | Facility: CLINIC | Age: 35
End: 2020-04-09
Payer: COMMERCIAL

## 2020-04-09 DIAGNOSIS — Z87.42 HISTORY OF DYSPAREUNIA IN FEMALE: Primary | ICD-10-CM

## 2021-07-20 ENCOUNTER — OFFICE VISIT (OUTPATIENT)
Dept: FAMILY MEDICINE CLINIC | Facility: CLINIC | Age: 36
End: 2021-07-20
Payer: COMMERCIAL

## 2021-07-20 VITALS
TEMPERATURE: 98 F | BODY MASS INDEX: 20.33 KG/M2 | RESPIRATION RATE: 18 BRPM | WEIGHT: 128 LBS | DIASTOLIC BLOOD PRESSURE: 65 MMHG | SYSTOLIC BLOOD PRESSURE: 95 MMHG | HEIGHT: 66.4 IN | HEART RATE: 74 BPM

## 2021-07-20 DIAGNOSIS — M67.471 GANGLION CYST OF RIGHT FOOT: Primary | ICD-10-CM

## 2021-07-20 PROCEDURE — 3074F SYST BP LT 130 MM HG: CPT | Performed by: FAMILY MEDICINE

## 2021-07-20 PROCEDURE — 3078F DIAST BP <80 MM HG: CPT | Performed by: FAMILY MEDICINE

## 2021-07-20 PROCEDURE — 3008F BODY MASS INDEX DOCD: CPT | Performed by: FAMILY MEDICINE

## 2021-07-20 PROCEDURE — 99213 OFFICE O/P EST LOW 20 MIN: CPT | Performed by: FAMILY MEDICINE

## 2021-07-20 NOTE — PROGRESS NOTES
HPI/Subjective:   Patient ID: Elizabeth Jo is a 43year old female. Pt presents with a lump of the back of the lateral aspect of right ankle area. No injury or trauma. Pt noticed this lump just a few days ago.  No sig pains but has notice some ting

## 2021-07-22 ENCOUNTER — OFFICE VISIT (OUTPATIENT)
Dept: PODIATRY CLINIC | Facility: CLINIC | Age: 36
End: 2021-07-22
Payer: COMMERCIAL

## 2021-07-22 ENCOUNTER — HOSPITAL ENCOUNTER (OUTPATIENT)
Dept: GENERAL RADIOLOGY | Age: 36
Discharge: HOME | End: 2021-07-22
Attending: PODIATRIST
Payer: COMMERCIAL

## 2021-07-22 ENCOUNTER — TELEPHONE (OUTPATIENT)
Dept: PODIATRY CLINIC | Facility: CLINIC | Age: 36
End: 2021-07-22

## 2021-07-22 DIAGNOSIS — M79.89 SOFT TISSUE MASS: Primary | ICD-10-CM

## 2021-07-22 DIAGNOSIS — M79.89 SOFT TISSUE MASS: ICD-10-CM

## 2021-07-22 PROCEDURE — 73610 X-RAY EXAM OF ANKLE: CPT | Performed by: PODIATRIST

## 2021-07-22 PROCEDURE — 99243 OFF/OP CNSLTJ NEW/EST LOW 30: CPT | Performed by: PODIATRIST

## 2021-07-22 NOTE — TELEPHONE ENCOUNTER
Per pt has appt 7/29 for a f/u on cyst and possible drain. Per pt not sure if her xray results require a procedure rather than an office visit.   If an office visit is the appropriate next step can she be seen in the afternoon instead of 9:10AM on 7/29(no a

## 2021-07-22 NOTE — PROGRESS NOTES
HPI:    Patient ID: Kole Gross is a 43year old female. Pleasant 38-year-old female presents as a new patient to me on consult from [address] [facility]. Patient is here because she thinks she has a cyst in her right foot.   She is noticed it just in the last Referrals:  None       [facility]#[id number]

## 2021-07-23 NOTE — TELEPHONE ENCOUNTER
Called pt LMTCB. If pt CB she does not need a procedure slot, but there are no other openings on 7/29/21. If pt needs a different time, then she will need to schedule for a different day.

## 2021-07-29 ENCOUNTER — OFFICE VISIT (OUTPATIENT)
Dept: PODIATRY CLINIC | Facility: CLINIC | Age: 36
End: 2021-07-29
Payer: COMMERCIAL

## 2021-07-29 VITALS — HEART RATE: 74 BPM | SYSTOLIC BLOOD PRESSURE: 106 MMHG | DIASTOLIC BLOOD PRESSURE: 73 MMHG

## 2021-07-29 DIAGNOSIS — M67.40 GANGLION: Primary | ICD-10-CM

## 2021-07-29 PROCEDURE — 3078F DIAST BP <80 MM HG: CPT | Performed by: PODIATRIST

## 2021-07-29 PROCEDURE — 99213 OFFICE O/P EST LOW 20 MIN: CPT | Performed by: PODIATRIST

## 2021-07-29 PROCEDURE — 3074F SYST BP LT 130 MM HG: CPT | Performed by: PODIATRIST

## 2021-07-29 NOTE — PROGRESS NOTES
HPI:    Patient ID: Dari Leong is a 43year old female. 43-year-old female presents for aspiration of a soft tissue mass on the lateral aspect of the right foot. After review of the procedure she signed a written consent.       ROS:       I did re

## 2021-07-29 NOTE — PROGRESS NOTES
Per Dr. Triny Man verbal instruction, draw up 3 mL 2% Lidocanine for cyst drainage of right ankle. Patient had left prior to obtaining post-injection vitals.

## 2021-07-30 ENCOUNTER — TELEPHONE (OUTPATIENT)
Dept: PODIATRY CLINIC | Facility: CLINIC | Age: 36
End: 2021-07-30

## 2021-07-30 NOTE — TELEPHONE ENCOUNTER
I spoke with pt who states that her right ankle cyst filled up after the aspiration. Denies redness or warmth to area. States it is the same. She would like it removed as it is bothering her when she wears shoes.   I informed her that Dr Ariella Healy will not

## 2021-08-03 ENCOUNTER — OFFICE VISIT (OUTPATIENT)
Dept: PODIATRY CLINIC | Facility: CLINIC | Age: 36
End: 2021-08-03
Payer: COMMERCIAL

## 2021-08-03 DIAGNOSIS — M67.40 GANGLION: Primary | ICD-10-CM

## 2021-08-03 PROCEDURE — 99213 OFFICE O/P EST LOW 20 MIN: CPT | Performed by: PODIATRIST

## 2021-08-03 NOTE — TELEPHONE ENCOUNTER
To leonel, schedule please. Removal of soft tissue mass lateral right foot. 1 hour, MAC anesthesia.  EOSC, thanks scr

## 2021-08-03 NOTE — TELEPHONE ENCOUNTER
Per Dr. Halina Lundborg- call pt and advise her no need for appt today since the aspiration didn't help we could schedule this procedure as outpt to remove.  Called pt LMTCB

## 2021-08-03 NOTE — TELEPHONE ENCOUNTER
S/w pt and she would still like to come to office today to discuss the procedure further and have Dr. María Peguero look at the foot. She has many questions. Mile Monteiro hold off on scheduling until pt seen today.

## 2021-08-03 NOTE — PROGRESS NOTES
HPI:    Patient ID: Jeremias Redd is a 43year old female. This 68-year-old female called and thought that the cyst was back and was ready to have surgery. I encouraged follow-up for further considerations.       ROS:              No current outpatie

## 2021-09-08 ENCOUNTER — OFFICE VISIT (OUTPATIENT)
Dept: PODIATRY CLINIC | Facility: CLINIC | Age: 36
End: 2021-09-08
Payer: COMMERCIAL

## 2021-09-08 VITALS — HEIGHT: 66 IN | BODY MASS INDEX: 20.89 KG/M2 | WEIGHT: 130 LBS

## 2021-09-08 DIAGNOSIS — M67.40 GANGLION: Primary | ICD-10-CM

## 2021-09-08 PROCEDURE — 3008F BODY MASS INDEX DOCD: CPT | Performed by: PODIATRIST

## 2021-09-08 PROCEDURE — 99213 OFFICE O/P EST LOW 20 MIN: CPT | Performed by: PODIATRIST

## 2021-09-08 NOTE — PROGRESS NOTES
HPI:    Patient ID: Nomi Anders is a 43year old female. This 25-year-old female presents for follow-up and continued frustrations associated with the soft tissue mass on the posterior lateral aspect of the right foot.   She has noticed it to be wel

## 2021-09-09 ENCOUNTER — TELEPHONE (OUTPATIENT)
Dept: PODIATRY CLINIC | Facility: CLINIC | Age: 36
End: 2021-09-09

## 2021-09-09 DIAGNOSIS — M79.89 MASS OF SOFT TISSUE OF RIGHT LOWER EXTREMITY: Primary | ICD-10-CM

## 2021-09-09 NOTE — TELEPHONE ENCOUNTER
Returned patient's call after receiving surgical schedule request for  Excision of soft tissue mass, lateral, right foot. Patient is requesting surgery for 9/24/21 at [facility]. This was scheduled this date.   Patient was told I would call her back on Monday,

## 2021-09-14 NOTE — TELEPHONE ENCOUNTER
Called patient this date and left message for her to call me back directly to review pre-op. Instructions and schedule post op visit. Managed care order placed this date.   Instructed patient to call me at [phone]

## 2021-09-14 NOTE — TELEPHONE ENCOUNTER
Patient called back and we reviewed pre-op instructions and she voiced understanding. Also sent them thru My Chart this date. Scheduled post op visits.

## 2021-09-20 ENCOUNTER — TELEPHONE (OUTPATIENT)
Dept: PODIATRY CLINIC | Facility: CLINIC | Age: 36
End: 2021-09-20

## 2021-09-20 NOTE — TELEPHONE ENCOUNTER
Per pt is scheduled for ganglion cyst removal on 9/24, states cyst has been reduced in size, asking if surgery can still be done. Please call thank you.

## 2021-09-20 NOTE — TELEPHONE ENCOUNTER
Spoke to pt and she states that cyst has decreased in size. Foot still has numbness and tingling. States cyst is still there. Pt wanting to know if she should still proceed with surgery.    Informed pt that Courtney Johnson is not in the office on Mondays but he will

## 2021-09-21 NOTE — TELEPHONE ENCOUNTER
Spoke to pt and informed her of Gail's message. Pt asking if Ju Mccarty will still be able to remove the cyst even if it is \"not filled up\" or small. Pt concerned because she is still having the numbness and tingling in her foot.      Dr. Ju Mccarty please ad

## 2021-10-01 ENCOUNTER — OFFICE VISIT (OUTPATIENT)
Dept: PODIATRY CLINIC | Facility: CLINIC | Age: 36
End: 2021-10-01
Payer: COMMERCIAL

## 2021-10-01 ENCOUNTER — TELEPHONE (OUTPATIENT)
Dept: PODIATRY CLINIC | Facility: CLINIC | Age: 36
End: 2021-10-01

## 2021-10-01 DIAGNOSIS — M79.89 MASS OF SOFT TISSUE OF RIGHT LOWER EXTREMITY: Primary | ICD-10-CM

## 2021-10-01 PROCEDURE — 99024 POSTOP FOLLOW-UP VISIT: CPT | Performed by: PODIATRIST

## 2021-10-01 NOTE — PROGRESS NOTES
HPI:    Patient ID: Cynthia Recinos is a 43year old female. This 24-year-old female presents 1 week post soft tissue mass lateral right foot. Patient states she is taken no pain medications and is doing great and has no apparent concerns.       ROS:

## 2021-10-01 NOTE — TELEPHONE ENCOUNTER
Per pt needs a 1 week follow up for next Friday 10/8 afternoon to remove stitches.  No available appointment please advise

## 2021-10-08 ENCOUNTER — OFFICE VISIT (OUTPATIENT)
Dept: PODIATRY CLINIC | Facility: CLINIC | Age: 36
End: 2021-10-08
Payer: COMMERCIAL

## 2021-10-08 DIAGNOSIS — M79.89 MASS OF SOFT TISSUE OF RIGHT LOWER EXTREMITY: Primary | ICD-10-CM

## 2021-10-08 PROCEDURE — 99024 POSTOP FOLLOW-UP VISIT: CPT | Performed by: PODIATRIST

## 2021-10-08 NOTE — PROGRESS NOTES
HPI:    Patient ID: Rose Mary Kimbrough is a 43year old female. Patient presents 2 weeks post soft tissue mass removal lateral aspect of the right foot. Patient's not requiring pain medications and seems to be doing well.     ROS:              Lita arteaga

## 2022-02-02 ENCOUNTER — OFFICE VISIT (OUTPATIENT)
Dept: OBGYN CLINIC | Facility: CLINIC | Age: 37
End: 2022-02-02
Payer: COMMERCIAL

## 2022-02-02 VITALS
DIASTOLIC BLOOD PRESSURE: 82 MMHG | WEIGHT: 131.19 LBS | SYSTOLIC BLOOD PRESSURE: 139 MMHG | HEART RATE: 87 BPM | BODY MASS INDEX: 21 KG/M2

## 2022-02-02 DIAGNOSIS — Z12.4 SCREENING FOR MALIGNANT NEOPLASM OF CERVIX: ICD-10-CM

## 2022-02-02 DIAGNOSIS — N94.10 DYSPAREUNIA IN FEMALE: ICD-10-CM

## 2022-02-02 DIAGNOSIS — Z01.419 ENCOUNTER FOR GYNECOLOGICAL EXAMINATION WITHOUT ABNORMAL FINDING: Primary | ICD-10-CM

## 2022-02-02 PROCEDURE — 3079F DIAST BP 80-89 MM HG: CPT | Performed by: OBSTETRICS & GYNECOLOGY

## 2022-02-02 PROCEDURE — 99395 PREV VISIT EST AGE 18-39: CPT | Performed by: OBSTETRICS & GYNECOLOGY

## 2022-02-02 PROCEDURE — 3075F SYST BP GE 130 - 139MM HG: CPT | Performed by: OBSTETRICS & GYNECOLOGY

## 2022-02-03 LAB — HPV I/H RISK 1 DNA SPEC QL NAA+PROBE: NEGATIVE

## 2022-02-14 NOTE — PROGRESS NOTES
[facility]. You have negative pap and HPV test. Continue yearly exam and do pap in 3 years.

## 2022-02-16 ENCOUNTER — OFFICE VISIT (OUTPATIENT)
Dept: PODIATRY CLINIC | Facility: CLINIC | Age: 37
End: 2022-02-16

## 2022-02-16 VITALS — SYSTOLIC BLOOD PRESSURE: 109 MMHG | DIASTOLIC BLOOD PRESSURE: 69 MMHG | HEART RATE: 79 BPM

## 2022-02-16 DIAGNOSIS — M67.40 GANGLION: Primary | ICD-10-CM

## 2022-02-16 PROCEDURE — 3074F SYST BP LT 130 MM HG: CPT | Performed by: PODIATRIST

## 2022-02-16 PROCEDURE — 3078F DIAST BP <80 MM HG: CPT | Performed by: PODIATRIST

## 2022-02-16 PROCEDURE — 20612 ASPIRATE/INJ GANGLION CYST: CPT | Performed by: PODIATRIST

## 2022-02-16 NOTE — PROGRESS NOTES
HPI:    Patient ID: Jomar Jim is a 42year old female. This 78-year-old female presents having not been seen in several months. The reason she is here is because of recurrent soft tissue mass on the lateral aspect of the right ankle. She states she was doing fine up until about a month ago and noticed a redevelopment of the soft tissue mass. It is a bit irritating due to some shoe wear. She is not aware of swelling or redness its never been open or draining. ROS:              No current outpatient medications on file. Allergies:No Known Allergies   PHYSICAL EXAM:     On physical exam pulses are normal.  There is no evidence of edema nor erythema. There is a palpable firm subcutaneous nodule that is just over a centimeter in diameter. It is consistent with a ganglionic cyst.  I again reviewed the etiology, progression, and the recurrent nature. We talked about options to be potential aspiration versus reoperation and surgical excision. Patient would like to try an aspiration. After discussion and review she signed a written consent. I localized the area with 2 cc of plain Xylocaine. After the usual prep I aspirated approximately 1 cm of a clear gelatinous-like material consistent with a ganglion. I applied a light dressing with some compression over the area. Follow-up as appropriate. She is well-informed         ASSESSMENT/PLAN:   Ganglion  (primary encounter diagnosis)    No orders of the defined types were placed in this encounter.       Meds This Visit:  Requested Prescriptions      No prescriptions requested or ordered in this encounter       Imaging & Referrals:  None       [place]#[id number]

## 2022-03-17 NOTE — PROGRESS NOTES
Subjective:   Patient ID: Jeana Emanuel is a 42year old female. HPI   with menses starting in July and now q 25d / 4d / normal. No BC. She is still nursing nightly. No new family or personal medical issues. Kids are well: Venetta Moh and Elif-2. History/Other:   Review of Systems   Constitutional: Negative for appetite change, fatigue and unexpected weight change. Eyes: Negative for visual disturbance. Respiratory: Negative for cough and shortness of breath. Cardiovascular: Negative for chest pain, palpitations and leg swelling. Gastrointestinal: Negative for abdominal distention, abdominal pain, blood in stool, constipation and diarrhea. Genitourinary: Positive for dyspareunia (intermittent on entry). Negative for dysuria, frequency, menstrual problem, pelvic pain and urgency. Musculoskeletal: Negative for arthralgias and myalgias. Skin: Negative for rash. Neurological: Negative for weakness, numbness and headaches. Psychiatric/Behavioral: Negative for dysphoric mood. The patient is not nervous/anxious. No current outpatient medications on file. Allergies:No Known Allergies    Objective:   Physical Exam  Constitutional:       General: She is not in acute distress. Appearance: She is well-developed. She is not diaphoretic. Neck:      Thyroid: No thyromegaly. Cardiovascular:      Rate and Rhythm: Normal rate and regular rhythm. Heart sounds: Normal heart sounds. No murmur heard. Pulmonary:      Effort: Pulmonary effort is normal.      Breath sounds: Normal breath sounds. No wheezing or rales. Abdominal:      General: There is no distension. Palpations: Abdomen is soft. There is no mass. Tenderness: There is no abdominal tenderness. There is no guarding or rebound. Genitourinary:     Labia:         Right: No rash or lesion. Left: No rash or lesion. Vagina: Normal. No vaginal discharge.       Cervix: No cervical motion tenderness or discharge. Uterus: Not enlarged and not tender. Adnexa:         Right: No mass, tenderness or fullness. Left: No mass, tenderness or fullness. Comments: Bilateral breasts without skin / nipple changes, mass, tenderness or axillary adenopathy. Musculoskeletal:         General: No tenderness. Cervical back: Neck supple. Lymphadenopathy:      Cervical: No cervical adenopathy. Neurological:      Mental Status: She is alert. Assessment & Plan:   Encounter for gynecological examination without abnormal finding  (primary encounter diagnosis)  Screening for malignant neoplasm of cervix  Dyspareunia in female  Lactating mother  Discussed BC and she will consider Mirena. OTC lubricants for entry dyspareunia.  Exam is normal.   Orders Placed This Encounter      Hpv Dna  High Risk , Thin Prep Collect      ThinPrep PAP Smear      THINPREP PAP SMEAR ONLY      Meds This Visit:  Requested Prescriptions      No prescriptions requested or ordered in this encounter       Imaging & Referrals:  None

## 2022-05-19 ENCOUNTER — OFFICE VISIT (OUTPATIENT)
Dept: INTEGRATIVE MEDICINE | Facility: CLINIC | Age: 37
End: 2022-05-19

## 2022-05-19 DIAGNOSIS — M67.471 GANGLION CYST OF RIGHT FOOT: Primary | ICD-10-CM

## 2022-05-19 NOTE — PROGRESS NOTES
Marlys Ibrahim is a 42year old female Acupuncture Therapy. Complaints:  1. Ganglion cyst of right foot    Response to last TX: Have not seen patient for two years which was for LBP during pregnancy. HPI: There is a 1/2 inch cyst on the lateral side of her ankle just posterior to the lateral malleolus. It is swollen and slightly red. No other concerns mentioned today    Objective: Cyst is swollen but not hard and not painful to the touch. Pulse is thin and sl wiry. TCM Diagnosis: UB Channel obstruction of Phlegm/damp      Plan:   Acupuncture Therapy: Bilateral trinh men  Second set: RT: Surround the dragon (4pts near UB 60), UB 58, LT KD 3    Face time 28 minutes  Total time 50 minutes    TX plan 1-2 tx per week for 4 weeks and re-evaluate  Goals: reduce size of cysts or eliminate comletely      Treatment performed by Carlos Kennedy. at [facility]. No follow-ups on file.     Higinio See, [facility]  5/19/2022  6:23 PM
yes

## 2022-05-26 ENCOUNTER — OFFICE VISIT (OUTPATIENT)
Dept: INTEGRATIVE MEDICINE | Facility: CLINIC | Age: 37
End: 2022-05-26

## 2022-05-26 DIAGNOSIS — M67.479 GANGLION CYST OF FOOT: Primary | ICD-10-CM

## 2022-05-26 NOTE — PATIENT INSTRUCTIONS
Instructions for Moxibustions:  1. Light Moxibustion stick with candle - you will know it is lit when the end is orange and or there is noticeable white jan.  2. Have an old soup container available to put jan into. Throughout the treatment you will notice jan forming on the end of the stick and this should be wiped off into the old soup can/jan container  3. In a circular motion move the moxa stick around the cyst until the flesh feels hot then stop and do it again 2 more times for a total of 3 rounds. 4. Make sure not to touch the skin with the stick at any time during treatment as it will burn  5. When finished, extinguish the stick in sand. If extinguishing in water, make sure hand is a way from the end of the stick. 6. This process can be done 2 times per day.

## 2022-07-18 ENCOUNTER — APPOINTMENT (OUTPATIENT)
Dept: URBAN - METROPOLITAN AREA CLINIC 244 | Age: 37
Setting detail: DERMATOLOGY
End: 2022-07-18

## 2022-07-18 DIAGNOSIS — M67.4 GANGLION: ICD-10-CM

## 2022-07-18 PROBLEM — M67.471 GANGLION, RIGHT ANKLE AND FOOT: Status: ACTIVE | Noted: 2022-07-18

## 2022-07-18 PROCEDURE — 99202 OFFICE O/P NEW SF 15 MIN: CPT | Mod: 25

## 2022-07-18 PROCEDURE — OTHER SEPARATE AND IDENTIFIABLE DOCUMENTATION: OTHER

## 2022-07-18 PROCEDURE — OTHER INCISION AND DRAINAGE: OTHER

## 2022-07-18 PROCEDURE — OTHER COUNSELING: OTHER

## 2022-07-18 PROCEDURE — OTHER ADDITIONAL NOTES: OTHER

## 2022-07-18 PROCEDURE — 10060 I&D ABSCESS SIMPLE/SINGLE: CPT

## 2022-07-18 ASSESSMENT — LOCATION DETAILED DESCRIPTION DERM: LOCATION DETAILED: RIGHT LATERAL ACHILLES SKIN

## 2022-07-18 ASSESSMENT — LOCATION ZONE DERM: LOCATION ZONE: FEET

## 2022-07-18 ASSESSMENT — LOCATION SIMPLE DESCRIPTION DERM: LOCATION SIMPLE: RIGHT FOOT

## 2022-07-18 NOTE — PROCEDURE: ADDITIONAL NOTES
Additional Notes: Cyst was drain in the office
Detail Level: Detailed
Render Risk Assessment In Note?: no

## 2022-07-18 NOTE — PROCEDURE: INCISION AND DRAINAGE
Detail Level: Simple
Consent was obtained and risks were reviewed including but not limited to delayed wound healing, infection, need for multiple I and D's, and pain.
Wound Care: Vaseline
Curette: No
Size Of Lesion In Cm (Optional But May Be Required For Some Insurances): 0
Epidermal Closure: simple interrupted
Lesion Type: Myxoid Cyst
Dressing: Band-Aid
Epidermal Sutures: 4-0 Ethilon
Post-Care Instructions: I reviewed with the patient in detail post-care instructions. Patient should keep wound covered and call the office should any redness, pain, swelling or worsening occur.
Method: sterile needle
Preparation Text: The area was prepped in the usual clean fashion.
Curette Text (Optional): After the contents were expressed a curette was used to partially remove the cyst wall.

## 2023-02-07 ENCOUNTER — OFFICE VISIT (OUTPATIENT)
Dept: OBGYN CLINIC | Facility: CLINIC | Age: 38
End: 2023-02-07

## 2023-02-07 VITALS
SYSTOLIC BLOOD PRESSURE: 122 MMHG | WEIGHT: 132 LBS | BODY MASS INDEX: 21 KG/M2 | DIASTOLIC BLOOD PRESSURE: 86 MMHG | HEART RATE: 88 BPM

## 2023-02-07 DIAGNOSIS — N92.1 METRORRHAGIA: ICD-10-CM

## 2023-02-07 DIAGNOSIS — N94.10 DYSPAREUNIA IN FEMALE: ICD-10-CM

## 2023-02-07 DIAGNOSIS — Z12.31 SCREENING MAMMOGRAM FOR BREAST CANCER: ICD-10-CM

## 2023-02-07 DIAGNOSIS — Z01.411 ENCOUNTER FOR GYNECOLOGICAL EXAMINATION WITH ABNORMAL FINDING: Primary | ICD-10-CM

## 2023-02-07 PROCEDURE — 99396 PREV VISIT EST AGE 40-64: CPT | Performed by: OBSTETRICS & GYNECOLOGY

## 2023-02-07 PROCEDURE — 3074F SYST BP LT 130 MM HG: CPT | Performed by: OBSTETRICS & GYNECOLOGY

## 2023-02-07 PROCEDURE — 3079F DIAST BP 80-89 MM HG: CPT | Performed by: OBSTETRICS & GYNECOLOGY

## 2023-02-08 ENCOUNTER — TELEPHONE (OUTPATIENT)
Dept: OBGYN CLINIC | Facility: CLINIC | Age: 38
End: 2023-02-08

## 2023-02-08 PROBLEM — N92.1 METRORRHAGIA: Status: ACTIVE | Noted: 2023-02-08

## 2023-02-08 NOTE — TELEPHONE ENCOUNTER
Can RN send contact info for Dr Vaughn Duckworth? I communicated with her and patient regarding an on-going problem. Thanks.

## 2023-02-08 NOTE — PROGRESS NOTES
Subjective:   Patient ID: Hu Burger is a 34year old female. HPI   with menses now q 20d / 8d / including 2 days spots followed by 2 heavy flow days followed 4 light days. No BC but Hodan Espinosa will seek vasectomy. She had dyspareunia since second delivery and has persisted after 3rd in  with Dr Luis Daniel Garcia. I reviewed delivery note and there was a repair of first degree laceration then. She describes pain on entry and continues to feel just too tight throughout. She did go through Pelvic floor PT but no complete resolution. New family medical issue with Mom having third MI at age 79. She is doing well and remains active. History/Other:   Review of Systems   Constitutional: Negative for appetite change, fatigue and unexpected weight change. Eyes: Negative for visual disturbance. Respiratory: Negative for cough and shortness of breath. Cardiovascular: Negative for chest pain, palpitations and leg swelling. Gastrointestinal: Negative for abdominal distention, abdominal pain, blood in stool, constipation and diarrhea. Genitourinary: Positive for dyspareunia and menstrual problem. Negative for dysuria, frequency, pelvic pain and urgency. Musculoskeletal: Negative for arthralgias and myalgias. Skin: Negative for rash. Neurological: Negative for weakness, numbness and headaches. Psychiatric/Behavioral: Negative for dysphoric mood. The patient is not nervous/anxious. No current outpatient medications on file. Allergies:No Known Allergies    Objective:   Physical Exam  Constitutional:       General: She is not in acute distress. Appearance: She is well-developed. She is not diaphoretic. Neck:      Thyroid: No thyromegaly. Cardiovascular:      Rate and Rhythm: Normal rate and regular rhythm. Heart sounds: Normal heart sounds. No murmur heard. Pulmonary:      Effort: Pulmonary effort is normal.      Breath sounds: Normal breath sounds. No wheezing or rales.    Chest: Breasts:     Right: Normal. No mass, nipple discharge, skin change or tenderness. Left: Normal. No mass, nipple discharge, skin change or tenderness. Comments:     Abdominal:      General: There is no distension. Palpations: Abdomen is soft. There is no mass. Tenderness: There is no abdominal tenderness. There is no guarding or rebound. Genitourinary:     Labia:         Right: No rash or lesion. Left: No rash or lesion. Vagina: Normal. No vaginal discharge. Cervix: No cervical motion tenderness or discharge. Uterus: Not enlarged and not tender. Adnexa:         Right: No mass, tenderness or fullness. Left: No mass, tenderness or fullness. Comments: On external exam, there is a tight yet superficial band of tissue transversely across fourchette. There is also an extension of skin measuring about 6 mm with a 1 mm opening with a drop of serous, not purulent drainage. Musculoskeletal:         General: No tenderness. Cervical back: Neck supple. Lymphadenopathy:      Cervical: No cervical adenopathy. Upper Body:      Right upper body: No supraclavicular, axillary or pectoral adenopathy. Left upper body: No supraclavicular, axillary or pectoral adenopathy. Neurological:      Mental Status: She is alert. Assessment & Plan:   Encounter for gynecological examination with abnormal finding  (primary encounter diagnosis)  Screening mammogram for breast cancer  Metrorrhagia  Dyspareunia in female  I discussed the possibility of using Mirena to decrease bleeding even after Fabiana Perone is proven sterile post vasectomy. Lastly we discussed the possibility of revision of perineum as outpatient , excising the aforementioned area. It appears cutaneous and not communicating with any other area such as rectum. There is no guarantee that this would resolve the dyspareunia. She is just not interested at this time.  Lastly I asked her if I can discuss with Uro-gynecology for possible second opinion in that office. She is interested and would even consent to further pelvic floor therapy based on Uro-gyne opinion. No orders of the defined types were placed in this encounter.       Meds This Visit:  Requested Prescriptions      No prescriptions requested or ordered in this encounter       Imaging & Referrals:  None

## 2023-03-14 ENCOUNTER — OFFICE VISIT (OUTPATIENT)
Dept: FAMILY MEDICINE CLINIC | Facility: CLINIC | Age: 38
End: 2023-03-14
Payer: COMMERCIAL

## 2023-03-14 VITALS
OXYGEN SATURATION: 98 % | DIASTOLIC BLOOD PRESSURE: 84 MMHG | WEIGHT: 133.38 LBS | TEMPERATURE: 97 F | BODY MASS INDEX: 21.44 KG/M2 | HEIGHT: 66 IN | SYSTOLIC BLOOD PRESSURE: 120 MMHG | RESPIRATION RATE: 18 BRPM | HEART RATE: 91 BPM

## 2023-03-14 DIAGNOSIS — J02.9 SORE THROAT: Primary | ICD-10-CM

## 2023-03-14 DIAGNOSIS — Z20.818 STREPTOCOCCUS EXPOSURE: ICD-10-CM

## 2023-03-14 LAB
CONTROL LINE PRESENT WITH A CLEAR BACKGROUND (YES/NO): YES YES/NO
STREP GRP A CUL-SCR: NEGATIVE

## 2023-03-14 PROCEDURE — 3008F BODY MASS INDEX DOCD: CPT | Performed by: NURSE PRACTITIONER

## 2023-03-14 PROCEDURE — 99213 OFFICE O/P EST LOW 20 MIN: CPT | Performed by: NURSE PRACTITIONER

## 2023-03-14 PROCEDURE — 87880 STREP A ASSAY W/OPTIC: CPT | Performed by: NURSE PRACTITIONER

## 2023-03-14 PROCEDURE — 87081 CULTURE SCREEN ONLY: CPT | Performed by: NURSE PRACTITIONER

## 2023-03-14 PROCEDURE — 3079F DIAST BP 80-89 MM HG: CPT | Performed by: NURSE PRACTITIONER

## 2023-03-14 PROCEDURE — 3074F SYST BP LT 130 MM HG: CPT | Performed by: NURSE PRACTITIONER

## 2023-03-28 ENCOUNTER — TELEPHONE (OUTPATIENT)
Dept: OBGYN CLINIC | Facility: CLINIC | Age: 38
End: 2023-03-28

## 2023-03-28 DIAGNOSIS — N92.1 MENORRHAGIA WITH IRREGULAR CYCLE: Primary | ICD-10-CM

## 2023-03-28 NOTE — TELEPHONE ENCOUNTER
Pt had annual apt in Feb with PHOENIX.  told her to get a ultrasound, not sure what type of ultrasound. Markie Vela said pt may have a cyst or fibroid.   Pt is having cramping regularly and irregular menses

## 2023-03-28 NOTE — TELEPHONE ENCOUNTER
Pt states at her visit with PHOENIX on February they discussed her cycles being closer together. Pt states she gets her period every 20 days. The first 2-3 days is spotting, then its 2 days of extremely heavy bleeding, then one day of moderate flow, then 2-3 days of spotting. Pt states this has beend going on for about 6 months. Pt states PHOENIX mentioned getting an US done, but no order was placed. Pt states for the past 2 months she's developed cramping, even when not on her period. Pt states the cramping comes and goes and she also feels bloated. Last week the pain was more severe like a shooting pain. Pt would like to schedule an US to see if the cause of her issues can be found. Pt informed PHOENIX is out of the office but message will be sent to him. Pt advised to go to the ER if pain reaches 7/10 or higher with pain meds. Pt also asked if there is a certain time of her cycle she should get US done if PHOENIX placed order. Message to PHOENIX for recs.

## 2023-04-19 PROBLEM — N92.1 MENORRHAGIA WITH IRREGULAR CYCLE: Status: ACTIVE | Noted: 2023-04-19

## 2023-04-19 NOTE — TELEPHONE ENCOUNTER
Informed pt we are waiting for PHOENIX response. Informed nurse will forward another message to PHOENIX to please review message below and advise. Pt requesting US order.

## 2023-04-21 ENCOUNTER — HOSPITAL ENCOUNTER (OUTPATIENT)
Dept: MAMMOGRAPHY | Age: 38
Discharge: HOME | End: 2023-04-21
Attending: OBSTETRICS & GYNECOLOGY
Payer: COMMERCIAL

## 2023-04-21 DIAGNOSIS — Z12.31 SCREENING MAMMOGRAM, ENCOUNTER FOR: ICD-10-CM

## 2023-04-21 PROCEDURE — 77063 BREAST TOMOSYNTHESIS BI: CPT | Performed by: OBSTETRICS & GYNECOLOGY

## 2023-04-21 PROCEDURE — 77067 SCR MAMMO BI INCL CAD: CPT | Performed by: OBSTETRICS & GYNECOLOGY

## 2023-05-10 ENCOUNTER — OFFICE VISIT (OUTPATIENT)
Dept: INTEGRATIVE MEDICINE | Facility: CLINIC | Age: 38
End: 2023-05-10

## 2023-05-10 DIAGNOSIS — M54.50 ACUTE LEFT-SIDED LOW BACK PAIN WITHOUT SCIATICA: Primary | ICD-10-CM

## 2023-05-10 NOTE — PROGRESS NOTES
Meena Garg is a 34year old female No chief complaint on file. .     Chief Complaint:    1. Acute lower back pain    Self reported health status:     Patient reported severity of symptom(s) over the last 24 hours  With zero reporting no symptoms and 10 reporting the worst severity    Symptom 1: 10 out of 10    Response to last [address]:  Last time Sara Rey was in for a treatment was on May 26, 2022. HPI: Today Sara Rey in coming in for severe back pain. She is unable to bend forward without severe pain, unable to sit with out severe pain or when she lays in bed with out pian. She states she is not sure what she did to her back. Objective (Pulse, Tongue, Vitals): Tongue: Red tip      Pulse: Rapid    TCM Diagnosis: Channel Obstruction UB channel  Plan of Care:  Acupuncture Therapy:    Set 1: Right: Bruce Max, SI-3, SI-4,SI-7    Set 2: KID-3, UB-60    Note: After the treatment dropped significantly, she could sit down, bend forward. She was in a lot less pain. Patient Goals: To reduce pain by 80% and allow her to be active without pain. Face Time: 23 min. Total Time:  60 min. [facility] No follow-ups on file.     Concepción Scott L.AC  5/10/2023  5:00 PM

## 2023-05-11 ENCOUNTER — OFFICE VISIT (OUTPATIENT)
Dept: PAIN CLINIC | Facility: CLINIC | Age: 38
End: 2023-05-11
Payer: COMMERCIAL

## 2023-05-11 DIAGNOSIS — M54.50 ACUTE LEFT-SIDED LOW BACK PAIN WITHOUT SCIATICA: Primary | ICD-10-CM

## 2023-05-11 PROCEDURE — 97810 ACUP 1/> WO ESTIM 1ST 15 MIN: CPT | Performed by: ACUPUNCTURIST

## 2023-05-11 PROCEDURE — 97811 ACUP 1/> W/O ESTIM EA ADD 15: CPT | Performed by: ACUPUNCTURIST

## 2023-05-11 NOTE — PROGRESS NOTES
Aron Robert is a 34year old female No chief complaint on file. .     Chief Complaint:    1. Lower back pain left side    Self reported health status:     Patient reported severity of symptom(s) over the last 24 hours  With zero reporting no symptoms and 10 reporting the worst severity    Symptom 1: 6 out of 10    Response to last [address]:  Franci Cornejo reports she had significant pain relief from yesterday's treatment. Her pain was much less the whole day, the day of acupuncture treatment. Today the pain is  back but not at a 10 out of 10 pain level , like yesterday. She tried to do some stretching and may have aggravated. Today the pain is a 6 out of 10. HPI: 5/10/2023 Today Franci Cornejo in coming in for severe back pain. She is unable to bend forward without severe pain, unable to sit with out severe pain or when she lays in bed with out pian. She states she is not sure what she did to her back. Objective (Pulse, Tongue, Vitals): Tongue: Red    Pulse: rapid    TCM Diagnosis: Channel Obstruction UB and GB    Plan of Care:  Acupuncture Therapy:    Set 1: Right: Yoon Hawkins, SI-3, SI-4, SI-7, GB-34    Set 2: GB-34, GB-33    Cupping: Left lower back    Note; After the treatment she was feeling better    Patient Goals: To reduce pain by 80% and allow her to be active without pain. Face Time: 23 min. Total Time: 60 min. Treatment performed by Halie Weiss at [facility]. No follow-ups on file.     Devere Hodgkins, [facility]  5/11/2023  4:44 PM

## 2023-05-12 ENCOUNTER — HOSPITAL ENCOUNTER (OUTPATIENT)
Age: 38
Discharge: HOME | End: 2023-05-12
Attending: EMERGENCY MEDICINE
Payer: COMMERCIAL

## 2023-05-12 ENCOUNTER — HOSPITAL ENCOUNTER (OUTPATIENT)
Dept: ULTRASOUND IMAGING | Age: 38
Discharge: HOME | End: 2023-05-12
Attending: OBSTETRICS & GYNECOLOGY
Payer: COMMERCIAL

## 2023-05-12 VITALS
RESPIRATION RATE: 16 BRPM | SYSTOLIC BLOOD PRESSURE: 149 MMHG | HEART RATE: 77 BPM | TEMPERATURE: 99 F | DIASTOLIC BLOOD PRESSURE: 90 MMHG | OXYGEN SATURATION: 100 %

## 2023-05-12 DIAGNOSIS — M54.32 SCIATICA OF LEFT SIDE: Primary | ICD-10-CM

## 2023-05-12 DIAGNOSIS — N92.1 MENORRHAGIA WITH IRREGULAR CYCLE: ICD-10-CM

## 2023-05-12 PROCEDURE — 99213 OFFICE O/P EST LOW 20 MIN: CPT

## 2023-05-12 PROCEDURE — 76856 US EXAM PELVIC COMPLETE: CPT | Performed by: OBSTETRICS & GYNECOLOGY

## 2023-05-12 PROCEDURE — 99204 OFFICE O/P NEW MOD 45 MIN: CPT

## 2023-05-12 PROCEDURE — 76830 TRANSVAGINAL US NON-OB: CPT | Performed by: OBSTETRICS & GYNECOLOGY

## 2023-05-12 RX ORDER — HYDROCODONE BITARTRATE AND ACETAMINOPHEN 5; 325 MG/1; MG/1
1-2 TABLET ORAL EVERY 6 HOURS PRN
Qty: 10 TABLET | Refills: 0 | Status: SHIPPED | OUTPATIENT
Start: 2023-05-12

## 2023-05-12 RX ORDER — PREDNISONE 20 MG/1
40 TABLET ORAL DAILY
Qty: 10 TABLET | Refills: 0 | Status: SHIPPED | OUTPATIENT
Start: 2023-05-12 | End: 2023-05-17

## 2023-05-12 RX ORDER — FAMOTIDINE 20 MG/1
20 TABLET, FILM COATED ORAL 2 TIMES DAILY PRN
Qty: 30 TABLET | Refills: 0 | Status: SHIPPED | OUTPATIENT
Start: 2023-05-12 | End: 2023-06-11

## 2023-05-12 NOTE — DISCHARGE INSTRUCTIONS
Thank you for visiting our immediate care for your health care needs. Please follow up with your regular doctor in the next 1-2 days. If you have any additional problems please return to the immediate care. Please take ibuprofen 600 mg 3 times a day as needed for pain. Please take steroids and Pepcid as prescribed. Use Salonpas lidocaine over-the-counter. Take Norco as prescribed. Please do not drink alcohol, drive, or operate heavy machinery on Norco. It can also make you constipated. You can take Colace over-the-counter to help prevent that.    Did not take additional acetaminophen with Norco. It is OK to take ibuprofen with norco.

## 2023-05-12 NOTE — ED INITIAL ASSESSMENT (HPI)
Patient with left sided low back pain that started 10 days ago without injury, over the last week worse.  Acupuncture provided some relief, Ibuprofen not helping    Patient states pain is down buttock and leg

## 2023-05-13 ENCOUNTER — TELEPHONE (OUTPATIENT)
Dept: OBGYN CLINIC | Facility: CLINIC | Age: 38
End: 2023-05-13

## 2023-05-13 DIAGNOSIS — R92.2 DENSE BREASTS: Primary | ICD-10-CM

## 2023-05-13 NOTE — TELEPHONE ENCOUNTER
[facility], diagnosis denses breasts.  Please call

## 2023-05-13 NOTE — TELEPHONE ENCOUNTER
Order placed for WBUS, left detailed message on private VM that order was placed. Number for CS given.

## 2023-05-16 ENCOUNTER — OFFICE VISIT (OUTPATIENT)
Dept: FAMILY MEDICINE CLINIC | Facility: CLINIC | Age: 38
End: 2023-05-16

## 2023-05-16 VITALS
HEIGHT: 66 IN | DIASTOLIC BLOOD PRESSURE: 76 MMHG | HEART RATE: 71 BPM | SYSTOLIC BLOOD PRESSURE: 111 MMHG | RESPIRATION RATE: 16 BRPM | TEMPERATURE: 98 F | BODY MASS INDEX: 21.05 KG/M2 | WEIGHT: 131 LBS

## 2023-05-16 DIAGNOSIS — M54.50 DORSALGIA OF LUMBAR REGION: Primary | ICD-10-CM

## 2023-05-16 PROCEDURE — 3078F DIAST BP <80 MM HG: CPT | Performed by: FAMILY MEDICINE

## 2023-05-16 PROCEDURE — 3008F BODY MASS INDEX DOCD: CPT | Performed by: FAMILY MEDICINE

## 2023-05-16 PROCEDURE — 3074F SYST BP LT 130 MM HG: CPT | Performed by: FAMILY MEDICINE

## 2023-05-16 PROCEDURE — 99213 OFFICE O/P EST LOW 20 MIN: CPT | Performed by: FAMILY MEDICINE

## 2023-05-17 ENCOUNTER — OFFICE VISIT (OUTPATIENT)
Dept: INTEGRATIVE MEDICINE | Facility: CLINIC | Age: 38
End: 2023-05-17
Payer: COMMERCIAL

## 2023-05-17 DIAGNOSIS — M54.50 DORSALGIA OF LUMBAR REGION: Primary | ICD-10-CM

## 2023-05-17 DIAGNOSIS — M54.50 ACUTE LEFT-SIDED LOW BACK PAIN WITHOUT SCIATICA: ICD-10-CM

## 2023-05-17 PROCEDURE — 97811 ACUP 1/> W/O ESTIM EA ADD 15: CPT | Performed by: ACUPUNCTURIST

## 2023-05-17 PROCEDURE — 97810 ACUP 1/> WO ESTIM 1ST 15 MIN: CPT | Performed by: ACUPUNCTURIST

## 2023-05-17 NOTE — PROGRESS NOTES
Jeana Emanuel is a 34year old female No chief complaint on file. .   Chief Complaint:    1. Lower back pain left side    Self reported health status:     Patient reported severity of symptom(s) over the last 24 hours  With zero reporting no symptoms and 10 reporting the worst severity    Symptom 1: 4-5    Response to last [address]:  Last treatment was helpful. Her pain was less when she left, but Friday it had got so painful she went into urgent care. They gave her prednisone for 5 day. The prednisone has been helping but does not want to be taking it long term. She had a follow-up with her PCP. Her PCP told her it may probable a strain, and if the pain continues some imaging will be done. HPI: 5/11/2023 Maxine Fitzgerald reports she had significant pain relief from yesterday's treatment. Her pain was much less the whole day, the day of acupuncture treatment. Today the pain is  back but not at a 10 out of 10 pain level , like yesterday. She tried to do some stretching and may have aggravated. Today the pain is a 6 out of 10. Objective (Pulse, Tongue, Vitals): Tongue:  Red    Pulse: slightly Red    TCM Diagnosis: Channel Obstruction on UB channel    Plan of Care:  Acupuncture Therapy:    Set 1: Left: DELMIS-5, DELMIS-6, Xiomara point DELMIS-7, SI-3, SI-4, SI-7    Patient Goals: To reduce pain by 80% and allow her to be active without pain. Face Time: 25 min. Total Time: 60 min. Treatment performed by Nida Walker at [facility]. No follow-ups on file.     Rena Dee, [facility]  5/17/2023  4:22 PM

## 2023-06-12 ENCOUNTER — OFFICE VISIT (OUTPATIENT)
Dept: PHYSICAL MEDICINE AND REHAB | Facility: CLINIC | Age: 38
End: 2023-06-12
Payer: COMMERCIAL

## 2023-06-12 VITALS
SYSTOLIC BLOOD PRESSURE: 110 MMHG | WEIGHT: 131 LBS | DIASTOLIC BLOOD PRESSURE: 68 MMHG | BODY MASS INDEX: 21.05 KG/M2 | HEART RATE: 93 BPM | HEIGHT: 66 IN | OXYGEN SATURATION: 98 %

## 2023-06-12 DIAGNOSIS — M54.42 ACUTE LEFT-SIDED LOW BACK PAIN WITH LEFT-SIDED SCIATICA: Primary | ICD-10-CM

## 2023-06-12 PROCEDURE — 3008F BODY MASS INDEX DOCD: CPT | Performed by: PHYSICAL MEDICINE & REHABILITATION

## 2023-06-12 PROCEDURE — 3074F SYST BP LT 130 MM HG: CPT | Performed by: PHYSICAL MEDICINE & REHABILITATION

## 2023-06-12 PROCEDURE — 99203 OFFICE O/P NEW LOW 30 MIN: CPT | Performed by: PHYSICAL MEDICINE & REHABILITATION

## 2023-06-12 PROCEDURE — 3078F DIAST BP <80 MM HG: CPT | Performed by: PHYSICAL MEDICINE & REHABILITATION

## 2023-06-12 NOTE — PATIENT INSTRUCTIONS
If needed you can take 600-800mg Ibuprofen with 1000mg of tylenol. If this becomes consistent, let me know and we will discuss other prescription options. I will give you the home exercise program that we discussed. I have reordered your xray, you can get this today if its convenient, I will release these results to your mychart. If this is persistent past 4 weeks, or especially if it worsens, come back and see me and we will discuss escalation of your care.

## 2023-06-16 ENCOUNTER — HOSPITAL ENCOUNTER (OUTPATIENT)
Dept: GENERAL RADIOLOGY | Age: 38
Discharge: HOME | End: 2023-06-16
Attending: PHYSICAL MEDICINE & REHABILITATION
Payer: COMMERCIAL

## 2023-06-16 DIAGNOSIS — M54.42 ACUTE LEFT-SIDED LOW BACK PAIN WITH LEFT-SIDED SCIATICA: ICD-10-CM

## 2023-06-16 PROCEDURE — 72110 X-RAY EXAM L-2 SPINE 4/>VWS: CPT | Performed by: PHYSICAL MEDICINE & REHABILITATION

## 2023-07-06 ENCOUNTER — TELEPHONE (OUTPATIENT)
Dept: OBGYN CLINIC | Facility: CLINIC | Age: 38
End: 2023-07-06

## 2023-07-06 NOTE — TELEPHONE ENCOUNTER
Pt cycle was every 3-weeks and had an ultrasound done with nothing remarkable, but this last month after 2-weeks and lasted over 1-week, then Tuesday started gushing blood with pain in lower right quandrant.

## 2023-07-06 NOTE — TELEPHONE ENCOUNTER
Pt calling indicating after shopping she noted a \"few gushes of blood running down my leg\" with a sharp shooting pain that proceeded the bleeding about 10 mins after. Pain was located in the RLQ, subsided after about 10 mins. Denies pain at this time and states bleeding is only spotting at this time. Pt denies chance of pregnancy. Indicates she has been having irregular cycles for the last since May, with cycles being every 2 wks with a wk long cycle. Pt states cycle ended this past Tuesday. Indicated to continue to monitor pain and bleeding. We did discuss possible ruptured cyst. We did discuss bleeding and pain precautions. Pt indicates she would like to see PHOENIX to discuss her irregular cycle.  Pt booked for Monday with PHOENIX.

## 2023-07-10 ENCOUNTER — OFFICE VISIT (OUTPATIENT)
Dept: OBGYN CLINIC | Facility: CLINIC | Age: 38
End: 2023-07-10

## 2023-07-10 ENCOUNTER — APPOINTMENT (OUTPATIENT)
Dept: URBAN - METROPOLITAN AREA CLINIC 244 | Age: 38
Setting detail: DERMATOLOGY
End: 2023-07-11

## 2023-07-10 VITALS
WEIGHT: 130 LBS | DIASTOLIC BLOOD PRESSURE: 84 MMHG | SYSTOLIC BLOOD PRESSURE: 136 MMHG | BODY MASS INDEX: 21 KG/M2 | HEART RATE: 83 BPM

## 2023-07-10 DIAGNOSIS — L82.1 OTHER SEBORRHEIC KERATOSIS: ICD-10-CM

## 2023-07-10 DIAGNOSIS — N92.1 MENORRHAGIA WITH IRREGULAR CYCLE: Primary | ICD-10-CM

## 2023-07-10 DIAGNOSIS — D22 MELANOCYTIC NEVI: ICD-10-CM

## 2023-07-10 DIAGNOSIS — L81.4 OTHER MELANIN HYPERPIGMENTATION: ICD-10-CM

## 2023-07-10 DIAGNOSIS — M67.4 GANGLION: ICD-10-CM

## 2023-07-10 PROBLEM — M67.471 GANGLION, RIGHT ANKLE AND FOOT: Status: ACTIVE | Noted: 2023-07-10

## 2023-07-10 PROBLEM — D22.5 MELANOCYTIC NEVI OF TRUNK: Status: ACTIVE | Noted: 2023-07-10

## 2023-07-10 PROCEDURE — 3075F SYST BP GE 130 - 139MM HG: CPT | Performed by: OBSTETRICS & GYNECOLOGY

## 2023-07-10 PROCEDURE — OTHER COUNSELING: OTHER

## 2023-07-10 PROCEDURE — 3079F DIAST BP 80-89 MM HG: CPT | Performed by: OBSTETRICS & GYNECOLOGY

## 2023-07-10 PROCEDURE — 99213 OFFICE O/P EST LOW 20 MIN: CPT

## 2023-07-10 PROCEDURE — 99213 OFFICE O/P EST LOW 20 MIN: CPT | Performed by: OBSTETRICS & GYNECOLOGY

## 2023-07-10 PROCEDURE — OTHER ADDITIONAL NOTES: OTHER

## 2023-07-10 ASSESSMENT — LOCATION SIMPLE DESCRIPTION DERM
LOCATION SIMPLE: RIGHT FOOT
LOCATION SIMPLE: ABDOMEN

## 2023-07-10 ASSESSMENT — LOCATION ZONE DERM
LOCATION ZONE: TRUNK
LOCATION ZONE: FEET

## 2023-07-10 ASSESSMENT — LOCATION DETAILED DESCRIPTION DERM
LOCATION DETAILED: RIGHT LATERAL ACHILLES SKIN
LOCATION DETAILED: PERIUMBILICAL SKIN

## 2023-07-10 NOTE — PROCEDURE: ADDITIONAL NOTES
Render Risk Assessment In Note?: no
Detail Level: Simple
Additional Notes: Discussed doing and incision and draining, then injecting a steroid. Patient will think about it declines to have it done today

## 2023-07-11 ENCOUNTER — HOSPITAL ENCOUNTER (OUTPATIENT)
Dept: ULTRASOUND IMAGING | Facility: HOSPITAL | Age: 38
Discharge: HOME | End: 2023-07-11
Attending: OBSTETRICS & GYNECOLOGY
Payer: COMMERCIAL

## 2023-07-11 ENCOUNTER — TELEPHONE (OUTPATIENT)
Dept: OBGYN CLINIC | Facility: CLINIC | Age: 38
End: 2023-07-11

## 2023-07-11 DIAGNOSIS — R92.2 DENSE BREASTS: ICD-10-CM

## 2023-07-11 PROCEDURE — 76641 ULTRASOUND BREAST COMPLETE: CPT | Performed by: OBSTETRICS & GYNECOLOGY

## 2023-07-11 NOTE — PROGRESS NOTES
Subjective:   Patient ID: Hu Burger is a 34year old female. HPI   with menses normally q 20d / 7d / 2 heavy days. She had normal menses in  followed by additional bleeding 1 week later and then again on . The latter bleed was also associated with a very short lived ( 10 minutes ) of RLQ pain. No current pain. No bowel or bladder complaints. History/Other:   Review of Systems   Constitutional:  Negative for appetite change, fatigue and unexpected weight change. Eyes:  Negative for visual disturbance. Respiratory:  Negative for cough and shortness of breath. Cardiovascular:  Negative for chest pain, palpitations and leg swelling. Gastrointestinal:  Negative for abdominal distention, abdominal pain, blood in stool, constipation and diarrhea. Genitourinary:  Positive for dyspareunia (chronic and discussed in previous notes. ). Negative for dysuria, frequency, menstrual problem, pelvic pain and urgency. Musculoskeletal:  Negative for arthralgias and myalgias. Skin:  Negative for rash. Neurological:  Negative for weakness, numbness and headaches. Psychiatric/Behavioral:  Negative for dysphoric mood. The patient is not nervous/anxious. Current Outpatient Medications   Medication Sig Dispense Refill    HYDROcodone-acetaminophen 5-325 MG Oral Tab Take 1-2 tablets by mouth every 6 (six) hours as needed for Pain. (Patient not taking: Reported on 2023) 10 tablet 0     Allergies:No Known Allergies    Objective:   Physical Exam  Constitutional:       General: She is not in acute distress. Appearance: Normal appearance. She is normal weight. She is not ill-appearing. Genitourinary:         Comments: EG show previous abnormal healing at [facility] as described in previous exams. No lesion or abnormal vaginal DC. Bimanual shows no mass or tenderness. Neurological:      Mental Status: She is alert.          Assessment & Plan:   Menorrhagia with irregular cycle (primary encounter diagnosis)  We reviewed the normal US from May. I discussed endometrial sampling if recurrent abnormal bleeding across 3 of 6 cycles. We had previously discussed Mirena IUD as possible treatment. Annual in February 2024. No orders of the defined types were placed in this encounter.       Meds This Visit:  Requested Prescriptions      No prescriptions requested or ordered in this encounter       Imaging & Referrals:  None

## 2023-07-11 NOTE — TELEPHONE ENCOUNTER
Pt saw PHOENIX for gyne visit yesterday and states PHOENIX had discussed labs being ordered. To PHOENIX to please advise, which labs would you like to have ordered? Thank you.

## 2023-07-11 NOTE — TELEPHONE ENCOUNTER
Patient had breast  ultrasound done and would like to discuss what an ultrasound biopsy? Would like to speak to   [id number] Moore

## 2023-07-11 NOTE — TELEPHONE ENCOUNTER
Informed pt that labs are now ordered per PHOENIX. Also reminded pt she will need to fast for 12 hrs prior to lab draw; sips of water are OK during this time. Advised pt that she does not need to make an appt, she can walk in to lab to have these labs done. Pt verbalized understanding.

## 2023-07-12 NOTE — TELEPHONE ENCOUNTER
I called and discussed results and recs with Derick Pappas. She is scheduled tomorrow. I told her I would call no matter what results are on path. It is up to her to read or not read results on My Chart.

## 2023-07-12 NOTE — DISCHARGE INSTRUCTIONS
The Doctor (Radiologist) who performed your procedure was: DR. Jeter Meaghan an ice pack over the biopsy site on top of your bra or on top of the ACE wrap (never apply ice directly over skin) for 10-15 minutes of every hour until bedtime for your comfort and to decrease bleeding. Keep your sports bra or the ACE wrap (stereotactic and MRI biopsy) in place for 24 hours after your biopsy. This compression decreases bleeding and breast movement for your comfort. Wear a supportive bra for the next couple of days for comfort (sports bra for sleep). Continue to wear, preferably, a sports bra or good supportive bra for 1 week and take off only to shower. No baths or showers during the first 24 hours after biopsy. After this time you may take a shower. It's okay if the strips get wet but do not soak them. NO saunas, hot tubs or swimming until steri-strips fall off (approx. 5 days). This prevents infection and allows time for them to completely close and heal.  DO NOT remove the steri-strips. They will fall off in 5 days. If any type of irritation (redness, itching or blisters) develops in the area around the steri-strips, remove them gently. If the steri-strips do not fall off after 5 days, gently remove them. Keep the area clean and dry. It is normal to have mild discomfort and bruising at the biopsy site. You may take Tylenol as needed for discomfort, as long as you have no allergies to Tylenol. Do not take aspirin, motrin, ibuprofen or any medication containing NSAID (non-steroidal anti-inflammatory drug) product for 48 hours. DO NOT participate in strenuous activity (aerobics, heavy lifting, housework, gardening, etc.) 48 hours after your biopsy to prevent bleeding. You will receive results in 2-3 business days. If you are having an MRI breast biopsy or an Ultrasound guided breast biopsy, you will be billed for the biopsy and unilateral mammogram separately.   If you have any questions about the procedure or your results, please contact the Breast Care Coordinator Nurse at [phone]. Notify your ordering physician or primary physician for increased bleeding, pain or fever over 100. Or contact a Radiology Nurse at [phone] between 8am-4pm (after 4pm, your call will be directed to the [facility]).

## 2023-07-13 ENCOUNTER — HOSPITAL ENCOUNTER (OUTPATIENT)
Dept: ULTRASOUND IMAGING | Facility: HOSPITAL | Age: 38
Discharge: HOME | End: 2023-07-13
Attending: OBSTETRICS & GYNECOLOGY
Payer: COMMERCIAL

## 2023-07-13 ENCOUNTER — HOSPITAL ENCOUNTER (OUTPATIENT)
Dept: MAMMOGRAPHY | Facility: HOSPITAL | Age: 38
Discharge: HOME | End: 2023-07-13
Attending: OBSTETRICS & GYNECOLOGY
Payer: COMMERCIAL

## 2023-07-13 DIAGNOSIS — N63.20 BREAST MASS, LEFT: ICD-10-CM

## 2023-07-13 DIAGNOSIS — N63.10 BREAST MASS, RIGHT: ICD-10-CM

## 2023-07-13 PROCEDURE — 19084 BX BREAST ADD LESION US IMAG: CPT | Performed by: OBSTETRICS & GYNECOLOGY

## 2023-07-13 PROCEDURE — 76882 US LMTD JT/FCL EVL NVASC XTR: CPT | Performed by: OBSTETRICS & GYNECOLOGY

## 2023-07-13 PROCEDURE — 88305 TISSUE EXAM BY PATHOLOGIST: CPT | Performed by: OBSTETRICS & GYNECOLOGY

## 2023-07-13 PROCEDURE — 19083 BX BREAST 1ST LESION US IMAG: CPT | Performed by: OBSTETRICS & GYNECOLOGY

## 2023-07-13 PROCEDURE — 77066 DX MAMMO INCL CAD BI: CPT | Performed by: OBSTETRICS & GYNECOLOGY

## 2023-07-13 NOTE — IMAGING NOTE
1130 Pt arrived to room #4 .  scans taken by EBEN CORONADO, ultrasound technologist    1200 Hx taken and is as follows:  CONCLUSION:                1. Right breast mass at 09:00 o'clock 6 centimeters from the nipple is low suspicion for malignancy. Ultrasound-guided biopsy is recommended. 2. Left breast mass at 06:00 o'clock 1 centimeter from the nipple is low suspicion for malignancy. Ultrasound-guided biopsy is recommended. Targeted ultrasound of the bilateral axilla should be performed same day prior to biopsy for evaluation of axillary lymph nodes. BI-RADS CATEGORY:  DIAGNOSTIC CATEGORY 4--SUSPICIOUS            RECOMMENDATIONS:   ULTRASOUND-GUIDED CORE BIOPSY: BILATERAL BREASTS :  RIGHT BREAST 1 SITE; LEFT BREAST 1 SITE     Dictated by (CST): Stephy Cole MD on 7/11/2023 at 1:33 PM       Finalized by (CST): Stephy Cole MD on 7/11/2023 at 1:34 PM       1245 Consent verified and obtained     1310 Imaging Completed by Dr Frankie Bloom    1311 Time out taken     1313 Skin prep with chloro prep sterile towels to site. Site marked LEFT BREAST    1314 Lidocaine  1% 10 milligrams per ml given from kit  total amount  3 ml given. 1315 Lidocaine 1% with epinephrine  1:100,000 units 200 milligrams per  20 ml given total amount 5 ml given. All cores to be placed in sterile cup with 10 ml normal saline   Site #1 LEFT BREAST 6 OCLOCK 1 CFN   Core # 1 at 1317    Last core taken at 1322  Total amount cores taken 9 placed in formalin at [address] placed at 1324    Site #2 RIGHT BREAST 9 OCLOCK 6 CFN   New sterile cup for site #2 . All cores to be placed in a sterile cup with 10 ml normal saline. 1325 Skin prep with chloro prep sterile towels to site. Site marked RIGHT BREAST 9 OCLOCK 6 CFN    1327 Lidocaine  1% 10 milligrams per ml given from kit  total amount  3 ml given.     1328 Lidocaine 1% with epinephrine  1:1000,000 units 200 milligrams per  20 ml given total amount 5 ml given.    Core # 1 at 1329    Last core taken at 1332 Total amount cores taken 7 [facility] placed at 1334    1334 Procedure completed. Pressure to site by Marylee Cowboy, RN. No active bleeding noted. Area cleaned steri strips to site by Marylee Cowboy RN. Ice pack to site . RN to assist patient to mammography. 1336 Post instructions given verbal et written. Avs summary sheet provided to patient. Patient verbalizes understanding and agreement.      1340 Specimens  taken to pathology by Maine Queen RN

## 2023-07-13 NOTE — PROCEDURES
[facility]  Procedure Note    [facility] Patient Status:  Outpatient    1985 MRN [id number]   [facility] Attending Roseann, 3 [facility] Day # 0 PCP Ishmael Milian MD     Procedure: Bilateral ultrasound guided breast biopsies: Right 1 site;  Left 1 site    Pre-Procedure Diagnosis:  Bilateral breast masses    Post-Procedure Diagnosis: Bilateral breast masses    Anesthesia:  Local    Findings:  Bilateral breast masses    Specimens: multiple cores at each site    Blood Loss:  minimal    Tourniquet Time: none  Complications:  None  Drains:  none    Darwin Robbins MD  2023

## 2023-07-14 ENCOUNTER — TELEPHONE (OUTPATIENT)
Dept: ULTRASOUND IMAGING | Facility: HOSPITAL | Age: 38
End: 2023-07-14

## 2023-07-14 NOTE — TELEPHONE ENCOUNTER
Kathryn Ruiz   is s/p biopsy . Phoned and introduced myself as breast coordinator . Reinforced to patient  post biopsy care and instructions . C/o post procedure took tylenol yesterday with good relief. Informed  and shared the pathology results as well as the recommendations from Dr Roya Li for her breast imaging  as follows:      Pathology results shared (see epic for dictated pathology and radiology procedure report)  and recommendations are as follows:            RECOMMENDATION:  As long as the patient's clinical breast exam remains unchanged, patient should return to annual screening mammogram schedule due April 2024. Mihai Galvinnowledges the above and denies questions. Kathryn Ruiz was also instructed to perform breast self exams and if any changes  develops any changes to contact ordering  physician immediately  for re evaluation. Aime Ramseybalizes understanding and agreement.

## 2023-07-18 ENCOUNTER — LAB ENCOUNTER (OUTPATIENT)
Dept: LAB | Facility: HOSPITAL | Age: 38
End: 2023-07-18
Attending: OBSTETRICS & GYNECOLOGY
Payer: COMMERCIAL

## 2023-07-18 DIAGNOSIS — Z13.21 SCREENING FOR ENDOCRINE, NUTRITIONAL, METABOLIC AND IMMUNITY DISORDER: ICD-10-CM

## 2023-07-18 DIAGNOSIS — Z13.228 SCREENING FOR ENDOCRINE, NUTRITIONAL, METABOLIC AND IMMUNITY DISORDER: ICD-10-CM

## 2023-07-18 DIAGNOSIS — Z13.29 SCREENING FOR ENDOCRINE, NUTRITIONAL, METABOLIC AND IMMUNITY DISORDER: ICD-10-CM

## 2023-07-18 DIAGNOSIS — N92.1 MENORRHAGIA WITH IRREGULAR CYCLE: ICD-10-CM

## 2023-07-18 DIAGNOSIS — Z13.0 SCREENING FOR ENDOCRINE, NUTRITIONAL, METABOLIC AND IMMUNITY DISORDER: ICD-10-CM

## 2023-07-18 LAB
ALBUMIN SERPL-MCNC: 3.7 G/DL (ref 3.4–5)
ALBUMIN/GLOB SERPL: 1.2 {RATIO} (ref 1–2)
ALP LIVER SERPL-CCNC: 36 U/L
ALT SERPL-CCNC: 17 U/L
ANION GAP SERPL CALC-SCNC: 6 MMOL/L (ref 0–18)
AST SERPL-CCNC: 15 U/L (ref 15–37)
BILIRUB SERPL-MCNC: 0.5 MG/DL (ref 0.1–2)
BUN BLD-MCNC: 11 MG/DL (ref 7–18)
BUN/CREAT SERPL: 16.2 (ref 10–20)
CALCIUM BLD-MCNC: 9.1 MG/DL (ref 8.5–10.1)
CHLORIDE SERPL-SCNC: 109 MMOL/L (ref 98–112)
CHOLEST SERPL-MCNC: 155 MG/DL (ref ?–200)
CO2 SERPL-SCNC: 24 MMOL/L (ref 21–32)
CREAT BLD-MCNC: 0.68 MG/DL
DEPRECATED RDW RBC AUTO: 40.4 FL (ref 35.1–46.3)
ERYTHROCYTE [DISTWIDTH] IN BLOOD BY AUTOMATED COUNT: 12.7 % (ref 11–15)
FASTING PATIENT LIPID ANSWER: YES
FASTING STATUS PATIENT QL REPORTED: YES
GFR SERPLBLD BASED ON 1.73 SQ M-ARVRAT: 113 ML/MIN/1.73M2 (ref 60–?)
GLOBULIN PLAS-MCNC: 3.1 G/DL (ref 2.8–4.4)
GLUCOSE BLD-MCNC: 82 MG/DL (ref 70–99)
HCT VFR BLD AUTO: 38.2 %
HDLC SERPL-MCNC: 61 MG/DL (ref 40–59)
HGB BLD-MCNC: 12.5 G/DL
LDLC SERPL CALC-MCNC: 81 MG/DL (ref ?–100)
MCH RBC QN AUTO: 28.4 PG (ref 26–34)
MCHC RBC AUTO-ENTMCNC: 32.7 G/DL (ref 31–37)
MCV RBC AUTO: 86.8 FL
NONHDLC SERPL-MCNC: 94 MG/DL (ref ?–130)
OSMOLALITY SERPL CALC.SUM OF ELEC: 286 MOSM/KG (ref 275–295)
PLATELET # BLD AUTO: 235 10(3)UL (ref 150–450)
POTASSIUM SERPL-SCNC: 4.1 MMOL/L (ref 3.5–5.1)
PROT SERPL-MCNC: 6.8 G/DL (ref 6.4–8.2)
RBC # BLD AUTO: 4.4 X10(6)UL
SODIUM SERPL-SCNC: 139 MMOL/L (ref 136–145)
T4 FREE SERPL-MCNC: 0.8 NG/DL (ref 0.8–1.7)
TRIGL SERPL-MCNC: 67 MG/DL (ref 30–149)
TSI SER-ACNC: 2.27 MIU/ML (ref 0.36–3.74)
VLDLC SERPL CALC-MCNC: 10 MG/DL (ref 0–30)
WBC # BLD AUTO: 5.6 X10(3) UL (ref 4–11)

## 2023-07-18 PROCEDURE — 80061 LIPID PANEL: CPT

## 2023-07-18 PROCEDURE — 84439 ASSAY OF FREE THYROXINE: CPT

## 2023-07-18 PROCEDURE — 85027 COMPLETE CBC AUTOMATED: CPT

## 2023-07-18 PROCEDURE — 36415 COLL VENOUS BLD VENIPUNCTURE: CPT

## 2023-07-18 PROCEDURE — 80053 COMPREHEN METABOLIC PANEL: CPT

## 2023-07-18 PROCEDURE — 84443 ASSAY THYROID STIM HORMONE: CPT

## 2023-08-08 NOTE — TELEPHONE ENCOUNTER
Pt saw Zaida Moore for her npn appt today and she had some follow up questions about the FTS. Pt did quants early in her pregnancy and they were not doubling. She had an US and it was normal.  At her appt today the [facility] were great.   Pt states BRADLEY told her to be
Pt would like to speak with the nurse, saw Alvarez Flores today for npn and has questions regarding a screening.  Please advise
Detail Level: Detailed
Detail Level: Generalized

## 2023-11-14 ENCOUNTER — TELEPHONE (OUTPATIENT)
Dept: OBGYN CLINIC | Facility: CLINIC | Age: 38
End: 2023-11-14

## 2023-11-14 NOTE — TELEPHONE ENCOUNTER
I spoke with Ender Casas. Bleeding has slowed now. Her Hgb was 12.5 in mid July. I am ok with observation. This is 2 occurrences in 2 months so if it happens again, then we'll plan EB in office. She'll need Motrin 400-600 mg 1-2 hours prior since 3 previous , last in .

## 2023-11-14 NOTE — TELEPHONE ENCOUNTER
Pt reports menses started on Sunday 11/12. Reports she only had spotting all day Sunday. The next day bleeding increased and was a regular flow. Today bleeding became heavier. Reports that from 11am to 12 pm today she soaked through a super tampon 4x in one hour. States she bleed through the tampon, regular pad and on to her clothing. Feels that last tampon has been in for about 45 minutes and bleeding slowed down. Denies pain and cramping. States passed \"bloody mucous chunks\" on the tampon 2x today. Cannot say if it was blood clots. States it was bigger than quarter size. Not on birth control. Reports her  has a vasectomy. Reports she saw PHOENIX in July for irregular bleeding. Bleeding was fine for a few months after July and then the last few months has been having irregular bleeding again. Today was the heaviest. Reports the irregular bleeding is sporadic. Sometimes the blood is pouring out for a short time and then it slows down. Informed pt message will be routed to PHOENIX to please advise. Pt can come in for appt on 11/16, just wants to make sure it is okay if she is still bleeding. Advised pt to take ibuprofen 600 mg with food every 6 hours to see if it helps slow down bleeding. Provided bleeding precautions.

## 2023-11-30 ENCOUNTER — TELEPHONE (OUTPATIENT)
Dept: OBGYN CLINIC | Facility: CLINIC | Age: 38
End: 2023-11-30

## 2023-11-30 NOTE — TELEPHONE ENCOUNTER
Hx of irregular bleeding. Seen in July. Bleeding resumed a few weeks ago, pt spoke to PHOENIX 11/14. He was okay to monitor at that time. Plan was for EMBX in office if bleeding returned.      Last night pink spotting night, dark spotting today.   Scheduled EMBX for 12/11/23. Pt aware cannot be done if bleeding.     If spotting turns to heavy flow, where she is soaking pad/tampons, then needs to call sooner. Pt states PHOENIX did mention provera if cycle is very heavy. Bleeding and pain precautions reviewed. Patient verbalized understanding.     To PHOENIX as FYI.

## 2023-11-30 NOTE — TELEPHONE ENCOUNTER
Pt states she had another heavy cycle, see encounter from 11/14, was told to call if happend again.

## 2023-12-04 ENCOUNTER — TELEPHONE (OUTPATIENT)
Dept: OBGYN CLINIC | Facility: CLINIC | Age: 38
End: 2023-12-04

## 2023-12-04 DIAGNOSIS — N92.1 MENORRHAGIA WITH IRREGULAR CYCLE: Primary | ICD-10-CM

## 2023-12-04 NOTE — TELEPHONE ENCOUNTER
Pt has embx scheduled for 12/11. Pt is asking for a hormone test to see if this is why her periods are crazy and maybe the biopsy would be unnecessary.     Pls advise

## 2023-12-04 NOTE — TELEPHONE ENCOUNTER
Pt notified that lab orders have been placed. She would like to discuss labs in detail with PHOENIX at visit. I called 1050 to move pt to noon. Once moved, we can change this slot to 20 min.

## 2023-12-04 NOTE — TELEPHONE ENCOUNTER
I am happy to order labs for [facility], Estradiol, Prolactin. Use menorrhagia with irregular cycle as diagnosis. I am also happy to address breast exam same day. My problem is that the 1050 slot is booked with an annual exam in 10 minutes. I just don't know how this keeps happening. The template should be absolute and yet this happens. Can we ask that patient to come at 12 if possible so I can spend appropriate amount of time with her? I am assisting Dr Smiley Hilliard in surgery after that.

## 2023-12-04 NOTE — TELEPHONE ENCOUNTER
Pt scheduled for [facility] 12/11 for multiple episodes of irregular spotting. Pt with several concerns she would like to mention. .     Pt wondering if possible to do any other hormone testing to determine cause of irregular cycles? Cycles have been occurring every 20 days on average. Checked TSH/ Free T4 in July. Pt also notes persisting RLQ, above pelvic bone. Pain is piercing pain that last a few minutes then resolves. This has been happening during period for several months. She believes this started prior to last [address] in May. Advised US showed cyst on left side, but not right. Pt states she is not worried, but wants this noted for PHOENIX.  Pt also complains of right breast pain near the nipple. Denies lumps, redness, swelling, nipple discharge or skin changes. She did have breast biopsy and biopsy clip placed in right breast in July, however she recalls clip closer to axilla, not nipple. She notes breast pain seems to occur prior to cycles and feels \"like a bruise\" when pressed. July biopsy was benign. To PHOENIX to advise. Can we do breast exam at 12/11 appt, or does she need separate appt? Please advise on labs and RLQ pain.

## 2023-12-11 ENCOUNTER — OFFICE VISIT (OUTPATIENT)
Dept: OBGYN CLINIC | Facility: CLINIC | Age: 38
End: 2023-12-11

## 2023-12-11 VITALS — DIASTOLIC BLOOD PRESSURE: 86 MMHG | SYSTOLIC BLOOD PRESSURE: 130 MMHG | HEART RATE: 77 BPM

## 2023-12-11 DIAGNOSIS — N92.1 MENORRHAGIA WITH IRREGULAR CYCLE: Primary | ICD-10-CM

## 2023-12-11 DIAGNOSIS — N64.4 MASTODYNIA: ICD-10-CM

## 2023-12-11 NOTE — PROGRESS NOTES
Endometrial Biopsy    Pre-Procedure Care:   Consent was obtained. Procedure/risks were explained. Questions were answered. Correct patient was identified. Correct side and site were confirmed. Pregnancy Results: n/a  Birth control method(s) used:  Vasectomy    Pre-Medications: The patient was premedicated with Ibuprofen . Description of Procedure:  Under satisfactory analgesia, the patient was prepped and draped in the dorsal lithotomy position. A bivalve speculum was placed in the vagina and the cervix was prepped with Betadine solution. Single tooth tenaculum placed at the 12 o'clock position. The cervix was dilated using Os finder  The uterine cavity was sounded at 7 cm. The endometrial cavity was curetted for pipelle tissue sampling, 1 passes. Specimen was sent to pathology. The single tooth tenaculum was removed. Silver nitrate was applied at the site of tenaculum application   Good hemostasis was noted. There were no complications. There was no blood loss. Discharge instructions were provided to the patient. Visit Plan:  Await final pathology prior to treatment. She will likely decline treatment if benign path.

## 2023-12-15 ENCOUNTER — LAB ENCOUNTER (OUTPATIENT)
Dept: LAB | Facility: HOSPITAL | Age: 38
End: 2023-12-15
Attending: OBSTETRICS & GYNECOLOGY
Payer: COMMERCIAL

## 2023-12-15 ENCOUNTER — TELEPHONE (OUTPATIENT)
Dept: OBGYN CLINIC | Facility: CLINIC | Age: 38
End: 2023-12-15

## 2023-12-15 DIAGNOSIS — N92.1 MENORRHAGIA WITH IRREGULAR CYCLE: ICD-10-CM

## 2023-12-15 LAB
ESTRADIOL SERPL-MCNC: 59 PG/ML
FSH SERPL-ACNC: 10.6 MIU/ML
PROLACTIN SERPL-MCNC: 5.4 NG/ML

## 2023-12-15 PROCEDURE — 82670 ASSAY OF TOTAL ESTRADIOL: CPT

## 2023-12-15 PROCEDURE — 83001 ASSAY OF GONADOTROPIN (FSH): CPT

## 2023-12-15 PROCEDURE — 36415 COLL VENOUS BLD VENIPUNCTURE: CPT

## 2023-12-15 PROCEDURE — 84146 ASSAY OF PROLACTIN: CPT

## 2023-12-15 NOTE — TELEPHONE ENCOUNTER
Jarvis Sim. The biopsy is benign as expected. Even though the pathology suggests polyp fragments, the ultrasound showed no visible polyp. This probably means there was something so small that it would not require any surgery. Therefore as we discussed, we can simply follow bleeding pattern and not seek any treatments for bleeding at this time. Let me know if something changes for the worse. Otherwise I'll see you for exam in a year since we did breast exam at this last visit. Written by Jerad Humphries DO on 12/12/2023  8:56 PM CST  Seen by patient Aron Robert on 12/13/2023  9:48 AM    Pt states she has period today, again this was 2 wks after her last. Pt has read PHOENIX note, but feels that she can not continue to have cycles every 2 wks for the next year. Pt encouraged to complete lab hormone lab work for PHOENIX to review. This will help give some more insight. Pt states understanding and will complete this weekend. Bleeding and pain precautions given.

## 2023-12-15 NOTE — TELEPHONE ENCOUNTER
Pt saw test results for biopsy and everything was normal, states problem is still persisting and looking for the next steps.  Please advise

## 2024-02-14 ENCOUNTER — TELEPHONE (OUTPATIENT)
Dept: FAMILY MEDICINE CLINIC | Facility: CLINIC | Age: 39
End: 2024-02-14

## 2024-02-14 NOTE — TELEPHONE ENCOUNTER
Pt would like an order for calcium CT scan due family of heart attacks. Pt feels fine but wants to be proactive since mom has had 2 heart attack and brother was having chest pain and his Dr did a calcium CT scan. Please advise

## 2024-02-14 NOTE — TELEPHONE ENCOUNTER
Can do CT calcium heart scan offered by hospital. It is out of pocket but about 50 dollars for just scan. Can call and schedule and does not need order for this The # to schedule is [phone]

## 2024-02-14 NOTE — TELEPHONE ENCOUNTER
Patient is due for annual visit, schedule and discuss at that time or would out of pocket screening test be appropriate for patient?     How would you prefer to best proceed with this?

## 2024-03-06 ENCOUNTER — HOSPITAL ENCOUNTER (OUTPATIENT)
Dept: CT IMAGING | Age: 39
Discharge: HOME | End: 2024-03-06
Attending: FAMILY MEDICINE

## 2024-03-06 DIAGNOSIS — Z13.6 SCREENING, ISCHEMIC HEART DISEASE: ICD-10-CM

## 2024-04-23 ENCOUNTER — HOSPITAL ENCOUNTER (OUTPATIENT)
Dept: MAMMOGRAPHY | Age: 39
Discharge: HOME | End: 2024-04-23
Attending: OBSTETRICS & GYNECOLOGY
Payer: COMMERCIAL

## 2024-04-23 DIAGNOSIS — Z12.31 SCREENING MAMMOGRAM FOR BREAST CANCER: ICD-10-CM

## 2024-04-23 PROCEDURE — 77063 BREAST TOMOSYNTHESIS BI: CPT | Performed by: OBSTETRICS & GYNECOLOGY

## 2024-04-23 PROCEDURE — 77067 SCR MAMMO BI INCL CAD: CPT | Performed by: OBSTETRICS & GYNECOLOGY

## 2024-04-26 ENCOUNTER — TELEPHONE (OUTPATIENT)
Dept: OBGYN CLINIC | Facility: CLINIC | Age: 39
End: 2024-04-26

## 2024-04-26 NOTE — TELEPHONE ENCOUNTER
Pt had mammo done on 4/23/24 and saw recs for breast US or MRI. Pt states she has dense breast tissue and last year she did the AWBUS and had findings of a mass on each breast that she had biopsy and was benign. Pt is concerned that mammo alone will not detect issues due to breast density.   Pt would like PHOENIX to review and provide recs if pt should do AWBUS or MRI?    Pt also reports ongoing issues with menstrual bleeding. Reports she has menses every 3 weeks and has normal flow for 4-6 days. Reports last month she had pinkish/brownish spotting about one week after menses had ended. Spotting lasted about 5-6 days. States last menses ended about one week ago and started pinkish spotting yesterday. Also feels bloated and cramps.   Reports she did work up with pelvis US normal and embx done in Dec was benign. States recs was for BC to help regulate menses.   Pt states  has vasectomy and she is not comfortable starting BC.   Pt is asking what is causing the irregular bleeding? Advised pt this can occur sometimes and since her work up was normal than it does not mean that there is something medically wrong. Advised that the BC can help regulate the cycle.     Pt would like for PHOENIX to explain if there is an underlying issue that is causing the irregular bleeding?  Pt is concerned that something is wrong.   Message to PHOENIX to please advise.

## 2024-04-26 NOTE — TELEPHONE ENCOUNTER
Patient calling about mammogram results, she has dense tissue, does she need ultrasound? Also she had a ultrasound & biopsy for spotting in Dec. Patient still spotting 2 wks & period for 1 wk

## 2024-05-03 ENCOUNTER — PATIENT MESSAGE (OUTPATIENT)
Dept: OBGYN CLINIC | Facility: CLINIC | Age: 39
End: 2024-05-03

## 2024-05-03 DIAGNOSIS — N92.1 MENOMETRORRHAGIA: Primary | ICD-10-CM

## 2024-05-03 NOTE — TELEPHONE ENCOUNTER
Informed patient we are waiting for Dr. Whitfield response. Apologized for the wait. TE printed and placed on Dr. Whitfield desk for review and message to Dr. Whitfield.

## 2024-05-03 NOTE — TELEPHONE ENCOUNTER
Patient called back regarding message below, request a nurse to call to discuss test results, and to see if she need a ultrasound and MRI

## 2024-05-04 NOTE — TELEPHONE ENCOUNTER
From: Rosa Paz  To: Damaso Whitfield  Sent: 5/3/2024 6:29 PM CDT  Subject: Mammogram & Period frequency     Hi Dr. Whitfield,     I had my annual mammogram April 23 and it’s all clear, but I have extremely dense breast tissue. Should I get a breast ultrasound or MRI this year? What’s the difference between the 2? I had the whole breast ultrasound last year after my mammogram (which was clear) and they detected 2 masses so I had 2 biopsies which were benign. I’m curious if I should do the same this year.     Also - separate issue - in December you did an endometrial biopsy bc my periods were so close together and I had either extreme heavy flow, or tiny spotting. The biopsy was clear but since December, I’ve had multiple cycles 2 weeks apart. I just want to make sure nothing is wrong because that doesn’t seem normal / healthy.    Thanks! Rosa

## 2024-05-06 NOTE — TELEPHONE ENCOUNTER
Patient called and is worried regarding every 2 week cycles. Patient states the first cycle is normal period, flow and length. Patient states two weeks later she will start to have bloating and cramping that is then proceeded by bleeding that ranges from light pink to a dark red color. Patient states this flow will also vary and qty and length. Patient worried there may be something more serious going on. Would like recommendations or appointment to be seen. Patient offered appointment with Dr. Osman on 5/16 at [facility] (2 OB slots used) to be seen for bleeding. Patient asking if an ultrasound should be done first since last ultrasound was over a year ago.     Patient would also like to discuss her Mammogram results also and wants Dr. Osman recommendations on need for WBUS  or MRI. Patient informed will touch base with Dr. Osman.     Discussed with Dr. Osman, states he will call patient this evening. Keep appointment tentatively at this time.

## 2024-05-07 NOTE — TELEPHONE ENCOUNTER
I spoke with Rosa. December and January with bleeding q 24d. No 2 months of 6-7d flow followed by no bleeding x 5d followed by 5 additional days of spotting. Though we had benign endometrial sampling in December, I recommend repeat US and larger sampling. We'll plan for repeat US- ordered. We'll also plan operative hysteroscopy with possible TruClear sampling.

## 2024-05-08 ENCOUNTER — HOSPITAL ENCOUNTER (OUTPATIENT)
Dept: ULTRASOUND IMAGING | Age: 39
Discharge: HOME | End: 2024-05-08
Attending: OBSTETRICS & GYNECOLOGY
Payer: COMMERCIAL

## 2024-05-08 DIAGNOSIS — N92.1 MENOMETRORRHAGIA: ICD-10-CM

## 2024-05-08 PROCEDURE — 76830 TRANSVAGINAL US NON-OB: CPT | Performed by: OBSTETRICS & GYNECOLOGY

## 2024-05-08 PROCEDURE — 76856 US EXAM PELVIC COMPLETE: CPT | Performed by: OBSTETRICS & GYNECOLOGY

## 2024-05-09 ENCOUNTER — TELEPHONE (OUTPATIENT)
Dept: OBGYN CLINIC | Facility: CLINIC | Age: 39
End: 2024-05-09

## 2024-05-09 DIAGNOSIS — N92.1 MENOMETRORRHAGIA: Primary | ICD-10-CM

## 2024-05-09 NOTE — TELEPHONE ENCOUNTER
OB GYN SURGICAL SCHEDULING    Assessment: Menometrorrhagia    Pre-Operative Procedure:  Operative hysteroscopy with possible TruClear endometrial sampling    Side: neither    In / on:     Date:  After June 8th    Admission:  Day Surgery    Anesthesia: General    Additional Orders:  Routine Orders    Comments / Orders to Nurse:

## 2024-05-10 NOTE — TELEPHONE ENCOUNTER
Spoke to patient. Aware surgery is scheduled on Tuesday,06/18/2024 at 130pm. Assisted patient with scheduling 2 week post op    To Dr. Whitfield: Patient has questions regarding ultrasound completed on Wednesday,5/8/24. States reviewed finding states reflecting left ovarian cyst would like to know if it would change plan of care. Is there less invasive options other than surgery?    States was also advised can not be on cycle for procedure. Was advised would need progesterone week leading up to surgery if starts a cycle. Would like to know would that prescription be sent now or does she need to call before surgery if starts cycle?    Called Jose Alberto at [phone] and was advised no prior authorization is needed for surgery. Call reference number [id number]    Minor case instructions sent via mychart    Delayed staff message sent to MD to please place pre-op orders    Entered in book and calendar

## 2024-05-28 ENCOUNTER — TELEPHONE (OUTPATIENT)
Dept: OBGYN CLINIC | Facility: CLINIC | Age: 39
End: 2024-05-28

## 2024-05-28 NOTE — TELEPHONE ENCOUNTER
Patient calling with questions regarding progesterone. Patient has a hysteroscopy with Dr. Whitfield on 6/18. Patient states he told her he can't do surgery if she is on her cycle, and if spotting may need to take progesterone. Patient reports she got her cycle 10 days ago, it ended on Friday, but then noticed she had spotting this afternoon. Patient asking if she should wait to see if she starts spotting or does she need to take it 1 week before her surgery.     Patient notified a message will be routed to Dr. Whitfield for review. Patient made aware he will not be back in office until 6/5. Advised patient to f/up in the afternoon if no response yet. Patient verbalized understanding.     Patient states she is going on vacation for 1 week from June 1st-8th. Patient reports she is tired, but feels overall fine. Patient asking if it is ok that she is spotting. Dr. Whitfield is aware of pt's bleeding pattern, and patient denies any worsening symptoms.      Patient advised that if he is already aware of her bleeding pattern/ spotting and if no new changes or worsening symptoms then ok to monitor at this time. Patient advised to contact our office if she has any worsening symptoms or concerns. Patient agreed. Pain/ bleeding/ ER precautions reviewed. Patient verbalized understanding.     Message to Dr. Del Castillo (on-call) to review if any further recommendations since Dr. Whitfield is out of the office.     To Dr. Whitfield to please review and advise regarding progesterone recs. Thank you.

## 2024-05-28 NOTE — TELEPHONE ENCOUNTER
Patient called in have a question Progesterone medication, and spotting.   Request for a nurse to call

## 2024-06-05 ENCOUNTER — TELEPHONE (OUTPATIENT)
Dept: OBGYN CLINIC | Facility: CLINIC | Age: 39
End: 2024-06-05

## 2024-06-05 NOTE — TELEPHONE ENCOUNTER
Patient is scheduled for a surgical procedure on 6/18. She has questions regarding the possible need for her to take progesterone prior to that. Please call to advise.

## 2024-06-05 NOTE — TELEPHONE ENCOUNTER
Patient with hysteroscopy scheduled for 6/18 with Dr. Whitfield. Patient's cycles are irregular. She states she was advised to take progesterone 1 week prior to procedure to prevent a cycle.     Patient states she is due to receive her cycle any day now, and it should be finished prior to surgery. However, per patient, she usually has spotting for a few days in between her cycles.     She is wondering if Dr. Whitfield still recommends that she still take progesterone, even if she only has spotting on the days leading up to the surgery. Per patient, she would like to avoid having to take the medication if possible.     To Dr. Whitfield to please advise, thank you!

## 2024-06-08 NOTE — TELEPHONE ENCOUNTER
I called Rosa and discussed expected bleeding. She believes she would finish menses by the 12th and if anything, a scant spot on day of procedure. I asked her to send message early on the 17th if any significant bleeding.

## 2024-06-11 ENCOUNTER — TELEPHONE (OUTPATIENT)
Dept: OBGYN CLINIC | Facility: CLINIC | Age: 39
End: 2024-06-11

## 2024-06-11 NOTE — TELEPHONE ENCOUNTER
Patient has Operative hysteroscopy with possible truclear endometrial sampling. Patient is scheduled on 6/18/2024. Patient calling regarding general anesthesia, patient thought she was going to be \"twilighted\". Patient informed for procedure being completed we would need her to be placed under general for relaxation for the provider to work and for patients comfort. Patient states understanding.

## 2024-06-17 PROBLEM — O09.521 AMA (ADVANCED MATERNAL AGE) MULTIGRAVIDA 35+, FIRST TRIMESTER (HCC): Status: RESOLVED | Noted: 2019-07-22 | Resolved: 2024-06-17

## 2024-06-17 PROBLEM — O35.HXX0 SHORT FEMUR OF FETUS ON PRENATAL ULTRASOUND (HCC): Status: RESOLVED | Noted: 2019-11-15 | Resolved: 2024-06-17

## 2024-06-17 PROBLEM — Z34.90 PREGNANCY (HCC): Status: RESOLVED | Noted: 2020-02-11 | Resolved: 2024-06-17

## 2024-06-17 NOTE — DISCHARGE INSTRUCTIONS
HOME INSTRUCTIONS  Nothing in vagina- no tampons or sexual activity.  May shower but no bath or swim. Call if increased pain, bleeding or oral temperature > 100.3.   AMBSURG HOME CARE INSTRUCTIONS: POST-OP ANESTHESIA  The medication that you received for sedation or general anesthesia can last up to 24 hours. Your judgment and reflexes may be altered, even if you feel like your normal self.      We Recommend:   Do not drive any motor vehicle or bicycle   Avoid mowing the lawn, playing sports, or working with power tools/applicances (power saws, electric knives or mixers)   That you have someone stay with you on your first night home   Do not drink alcohol or take sleeping pills or tranquilizers   Do not sign legal documents within 24 hours of your procedure   If you had a nerve block for your surgery, take extra care not to put any pressure on your arm or hand for 24 hours    It is normal:  For you to have a sore throat if you had a breathing tube during surgery (while you were asleep!). The sore throat should get better within 48 hours. You can gargle with warm salt water (1/2 tsp in 4 oz warm water) or use a throat lozenge for comfort  To feel muscle aches or soreness especially in the abdomen, chest or neck. The achy feeling should go away in the next 24 hours  To feel weak, sleepy or \"wiped out\". Your should start feeling better in the next 24 hours.   To experience mild discomforts such as sore lip or tongue, headache, cramps, gas pains or a bloated feeling in your abdomen.   To experience mild back pain or soreness for a day or two if you had spinal or epidural anesthesia.   If you had laparoscopic surgery, to feel shoulder pain or discomfort on the day of surgery.   For some patients to have nausea after surgery/anesthesia    If you feel nausea or experience vomiting:   Try to move around less.   Eat less than usual or drink only liquids until the next morning   Nausea should resolve in about 24 hours    If  you have a problem when you are at home:    Call your surgeons office   Discharge Instructions: After Your Surgery  You’ve just had surgery. During surgery, you were given medicine called anesthesia to keep you relaxed and free of pain. After surgery, you may have some pain or nausea. This is common. Here are some tips for feeling better and getting well after surgery.   Going home  Your healthcare provider will show you how to take care of yourself when you go home. They'll also answer your questions. Have an adult family member or friend drive you home. For the first 24 hours after your surgery:   Don't drive or use heavy equipment.  Don't make important decisions or sign legal papers.  Take medicines as directed.  Don't drink alcohol.  Have someone stay with you, if needed. They can watch for problems and help keep you safe.  Be sure to go to all follow-up visits with your healthcare provider. And rest after your surgery for as long as your provider tells you to.   Coping with pain  If you have pain after surgery, pain medicine will help you feel better. Take it as directed, before pain becomes severe. Also, ask your healthcare provider or pharmacist about other ways to control pain. This might be with heat, ice, or relaxation. And follow any other instructions your surgeon or nurse gives you.      Stay on schedule with your medicine.     Tips for taking pain medicine  To get the best relief possible, remember these points:   Pain medicines can upset your stomach. Taking them with a little food may help.  Most pain relievers taken by mouth need at least 20 to 30 minutes to start to work.  Don't wait till your pain becomes severe before you take your medicine. Try to time your medicine so that you can take it before starting an activity. This might be before you get dressed, go for a walk, or sit down for dinner.  Constipation is a common side effect of some pain medicines. Call your healthcare provider before  taking any medicines such as laxatives or stool softeners to help ease constipation. Also ask if you should skip any foods. Drinking lots of fluids and eating foods such as fruits and vegetables that are high in fiber can also help. Remember, don't take laxatives unless your surgeon has prescribed them.  Drinking alcohol and taking pain medicine can cause dizziness and slow your breathing. It can even be deadly. Don't drink alcohol while taking pain medicine.  Pain medicine can make you react more slowly to things. Don't drive or run machinery while taking pain medicine.  Your healthcare provider may tell you to take acetaminophen to help ease your pain. Ask them how much you're supposed to take each day. Acetaminophen or other pain relievers may interact with your prescription medicines or other over-the-counter (OTC) medicines. Some prescription medicines have acetaminophen and other ingredients in them. Using both prescription and OTC acetaminophen for pain can cause you to accidentally overdose. Read the labels on your OTC medicines with care. This will help you to clearly know the list of ingredients, how much to take, and any warnings. It may also help you not take too much acetaminophen. If you have questions or don't understand the information, ask your pharmacist or healthcare provider to explain it to you before you take the OTC medicine.   Managing nausea  Some people have an upset stomach (nausea) after surgery. This is often because of anesthesia, pain, or pain medicine, less movement of food in the stomach, or the stress of surgery. These tips will help you handle nausea and eat healthy foods as you get better. If you were on a special food plan before surgery, ask your healthcare provider if you should follow it while you get better. Check with your provider on how your eating should progress. It may depend on the surgery you had. These general tips may help:   Don't push yourself to eat. Your body  will tell you when to eat and how much.  Start off with clear liquids and soup. They're easier to digest.  Next try semi-solid foods as you feel ready. These include mashed potatoes, applesauce, and gelatin.  Slowly move to solid foods. Don’t eat fatty, rich, or spicy foods at first.  Don't force yourself to have 3 large meals a day. Instead eat smaller amounts more often.  Take pain medicines with a small amount of solid food, such as crackers or toast. This helps prevent nausea.  When to call your healthcare provider  Call your healthcare provider right away if you have any of these:   You still have too much pain, or the pain gets worse, after taking the medicine. The medicine may not be strong enough. Or there may be a complication from the surgery.  You feel too sleepy, dizzy, or groggy. The medicine may be too strong.  Side effects such as nausea or vomiting. Your healthcare provider may advise taking other medicines to .  Skin changes such as rash, itching, or hives. This may mean you have an allergic reaction. Your provider may advise taking other medicines.  The incision looks different (for instance, part of it opens up).  Bleeding or fluid leaking from the incision site, and weren't told to expect that.  Fever of 100.4°F (38°C) or higher, or as directed by your provider.  Call 911  Call 911 right away if you have:   Trouble breathing  Facial swelling    If you have obstructive sleep apnea   You were given anesthesia medicine during surgery to keep you comfortable and free of pain. After surgery, you may have more apnea spells because of this medicine and other medicines you were given. The spells may last longer than normal.    At home:  Keep using the continuous positive airway pressure (CPAP) device when you sleep. Unless your healthcare provider tells you not to, use it when you sleep, day or night. CPAP is a common device used to treat obstructive sleep apnea.  Talk with your provider before taking  any pain medicine, muscle relaxants, or sedatives. Your provider will tell you about the possible dangers of taking these medicines.  Contact your provider if your sleeping changes a lot even when taking medicines as directed.  Tom last reviewed this educational content on 10/1/2021  © 2285-3041 [employer]. All rights reserved. This information is not intended as a substitute for professional medical care. Always follow your healthcare professional's instructions.

## 2024-06-18 ENCOUNTER — ANESTHESIA EVENT (OUTPATIENT)
Dept: SURGERY | Facility: HOSPITAL | Age: 39
End: 2024-06-18

## 2024-06-18 ENCOUNTER — ANESTHESIA (OUTPATIENT)
Dept: SURGERY | Facility: HOSPITAL | Age: 39
End: 2024-06-18

## 2024-06-18 ENCOUNTER — HOSPITAL ENCOUNTER (OUTPATIENT)
Facility: HOSPITAL | Age: 39
Setting detail: HOSPITAL OUTPATIENT SURGERY
Discharge: HOME | End: 2024-06-18
Attending: OBSTETRICS & GYNECOLOGY | Admitting: OBSTETRICS & GYNECOLOGY

## 2024-06-18 VITALS
RESPIRATION RATE: 16 BRPM | HEIGHT: 66 IN | TEMPERATURE: 99 F | OXYGEN SATURATION: 100 % | DIASTOLIC BLOOD PRESSURE: 62 MMHG | HEART RATE: 62 BPM | SYSTOLIC BLOOD PRESSURE: 114 MMHG | BODY MASS INDEX: 21.53 KG/M2 | WEIGHT: 134 LBS

## 2024-06-18 DIAGNOSIS — N92.1 MENOMETRORRHAGIA: ICD-10-CM

## 2024-06-18 LAB — B-HCG UR QL: NEGATIVE

## 2024-06-18 PROCEDURE — 0UDB8ZX EXTRACTION OF ENDOMETRIUM, VIA NATURAL OR ARTIFICIAL OPENING ENDOSCOPIC, DIAGNOSTIC: ICD-10-PCS | Performed by: OBSTETRICS & GYNECOLOGY

## 2024-06-18 PROCEDURE — 58558 HYSTEROSCOPY BIOPSY: CPT | Performed by: OBSTETRICS & GYNECOLOGY

## 2024-06-18 RX ORDER — ACETAMINOPHEN 500 MG
1000 TABLET ORAL ONCE AS NEEDED
Status: DISCONTINUED | OUTPATIENT
Start: 2024-06-18 | End: 2024-06-18

## 2024-06-18 RX ORDER — ACETAMINOPHEN 500 MG
1000 TABLET ORAL ONCE
Status: COMPLETED | OUTPATIENT
Start: 2024-06-18 | End: 2024-06-18

## 2024-06-18 RX ORDER — SODIUM CHLORIDE, SODIUM LACTATE, POTASSIUM CHLORIDE, CALCIUM CHLORIDE 600; 310; 30; 20 MG/100ML; MG/100ML; MG/100ML; MG/100ML
INJECTION, SOLUTION INTRAVENOUS CONTINUOUS
Status: DISCONTINUED | OUTPATIENT
Start: 2024-06-18 | End: 2024-06-18

## 2024-06-18 RX ORDER — HYDROMORPHONE HYDROCHLORIDE 1 MG/ML
0.4 INJECTION, SOLUTION INTRAMUSCULAR; INTRAVENOUS; SUBCUTANEOUS EVERY 5 MIN PRN
Status: DISCONTINUED | OUTPATIENT
Start: 2024-06-18 | End: 2024-06-18

## 2024-06-18 RX ORDER — ONDANSETRON 2 MG/ML
INJECTION INTRAMUSCULAR; INTRAVENOUS AS NEEDED
Status: DISCONTINUED | OUTPATIENT
Start: 2024-06-18 | End: 2024-06-18 | Stop reason: SURG

## 2024-06-18 RX ORDER — PROCHLORPERAZINE EDISYLATE 5 MG/ML
5 INJECTION INTRAMUSCULAR; INTRAVENOUS EVERY 8 HOURS PRN
Status: DISCONTINUED | OUTPATIENT
Start: 2024-06-18 | End: 2024-06-18

## 2024-06-18 RX ORDER — DEXAMETHASONE SODIUM PHOSPHATE 4 MG/ML
VIAL (ML) INJECTION AS NEEDED
Status: DISCONTINUED | OUTPATIENT
Start: 2024-06-18 | End: 2024-06-18 | Stop reason: SURG

## 2024-06-18 RX ORDER — HYDROMORPHONE HYDROCHLORIDE 1 MG/ML
0.6 INJECTION, SOLUTION INTRAMUSCULAR; INTRAVENOUS; SUBCUTANEOUS EVERY 5 MIN PRN
Status: DISCONTINUED | OUTPATIENT
Start: 2024-06-18 | End: 2024-06-18

## 2024-06-18 RX ORDER — ONDANSETRON 2 MG/ML
4 INJECTION INTRAMUSCULAR; INTRAVENOUS EVERY 6 HOURS PRN
Status: DISCONTINUED | OUTPATIENT
Start: 2024-06-18 | End: 2024-06-18

## 2024-06-18 RX ORDER — MIDAZOLAM HYDROCHLORIDE 1 MG/ML
INJECTION INTRAMUSCULAR; INTRAVENOUS AS NEEDED
Status: DISCONTINUED | OUTPATIENT
Start: 2024-06-18 | End: 2024-06-18 | Stop reason: SURG

## 2024-06-18 RX ORDER — KETOROLAC TROMETHAMINE 30 MG/ML
INJECTION, SOLUTION INTRAMUSCULAR; INTRAVENOUS AS NEEDED
Status: DISCONTINUED | OUTPATIENT
Start: 2024-06-18 | End: 2024-06-18 | Stop reason: SURG

## 2024-06-18 RX ORDER — LIDOCAINE HYDROCHLORIDE 10 MG/ML
INJECTION, SOLUTION EPIDURAL; INFILTRATION; INTRACAUDAL; PERINEURAL AS NEEDED
Status: DISCONTINUED | OUTPATIENT
Start: 2024-06-18 | End: 2024-06-18 | Stop reason: SURG

## 2024-06-18 RX ORDER — NALOXONE HYDROCHLORIDE 0.4 MG/ML
0.08 INJECTION, SOLUTION INTRAMUSCULAR; INTRAVENOUS; SUBCUTANEOUS AS NEEDED
Status: DISCONTINUED | OUTPATIENT
Start: 2024-06-18 | End: 2024-06-18

## 2024-06-18 RX ORDER — HYDROMORPHONE HYDROCHLORIDE 1 MG/ML
0.2 INJECTION, SOLUTION INTRAMUSCULAR; INTRAVENOUS; SUBCUTANEOUS EVERY 5 MIN PRN
Status: DISCONTINUED | OUTPATIENT
Start: 2024-06-18 | End: 2024-06-18

## 2024-06-18 RX ADMIN — KETOROLAC TROMETHAMINE 30 MG: 30 INJECTION, SOLUTION INTRAMUSCULAR; INTRAVENOUS at 13:55:00

## 2024-06-18 RX ADMIN — DEXAMETHASONE SODIUM PHOSPHATE 8 MG: 4 MG/ML VIAL (ML) INJECTION at 13:44:00

## 2024-06-18 RX ADMIN — SODIUM CHLORIDE, SODIUM LACTATE, POTASSIUM CHLORIDE, CALCIUM CHLORIDE: 600; 310; 30; 20 INJECTION, SOLUTION INTRAVENOUS at 13:55:00

## 2024-06-18 RX ADMIN — SODIUM CHLORIDE, SODIUM LACTATE, POTASSIUM CHLORIDE, CALCIUM CHLORIDE: 600; 310; 30; 20 INJECTION, SOLUTION INTRAVENOUS at 13:32:00

## 2024-06-18 RX ADMIN — MIDAZOLAM HYDROCHLORIDE 2 MG: 1 INJECTION INTRAMUSCULAR; INTRAVENOUS at 13:32:00

## 2024-06-18 RX ADMIN — ONDANSETRON 4 MG: 2 INJECTION INTRAMUSCULAR; INTRAVENOUS at 13:51:00

## 2024-06-18 RX ADMIN — LIDOCAINE HYDROCHLORIDE 50 MG: 10 INJECTION, SOLUTION EPIDURAL; INFILTRATION; INTRACAUDAL; PERINEURAL at 13:35:00

## 2024-06-18 NOTE — ANESTHESIA PREPROCEDURE EVALUATION
Anesthesia PreOp Note      39 year old F no significant PMHx; presenting for hysteroscopic truclear endometrial sampling.    HPI:     Rosa Paz is a 39 year old female who presents for preoperative consultation requested by: Damaso Whitfield DO    Date of Surgery: 2024    Procedure(s):  Operative hysteroscopy with possible truclear endometrial sampling  Indication: Menometrorrhagia [N92.1]    Relevant Problems   No relevant active problems       NPO:  Last Liquid Consumption Date: 24  Last Liquid Consumption Time: 0800 (water before 0800)  Last Solid Consumption Date: 24  Last Solid Consumption Time: 1900  Last Liquid Consumption Date: 24          History Review:  Patient Active Problem List    Diagnosis Date Noted    Mastodynia 2023    Menorrhagia with irregular cycle 2023    Metrorrhagia 2023    History of dyspareunia in female 2020    Dyspareunia in female 2017       Past Medical History:    Anemia    Chicken pox    Foot fracture, left    Hx of motion sickness       Past Surgical History:   Procedure Laterality Date    Colonoscopy  2013    Egd scope  2013    Needle biopsy left Left 2023    us guided bx    Needle biopsy right Right 2023    us guided bx      2016 & 18    [place] teeth removed         No medications prior to admission.     Current Facility-Administered Medications Ordered in Epic   Medication Dose Route Frequency Provider Last Rate Last Admin    lactated ringers infusion   Intravenous Continuous Damaso Whitfield DO 20 mL/hr at 24 1221 New Bag at 24 1221     No current [facility]-ordered outpatient medications on file.       No Known Allergies    Family History   Problem Relation Age of Onset    Heart Attack Mother 50        myocardial infarction    Heart Disease Mother 55        CAD, had triple bypass in     Eye Problems Father         ophthamology issues    Pancreatic Cancer Maternal  Grandmother         around age 80    Cancer Maternal Grandmother         pancreatic    Cancer Maternal Grandfather         lung-smoker     Social History     Socioeconomic History    Marital status:     Number of children: 1   Occupational History    Occupation: Homemaker    Occupation: Marketing     Comment: maternity leave   Tobacco Use    Smoking status: Never    Smokeless tobacco: Never   Vaping Use    Vaping status: Never Used   Substance and Sexual Activity    Alcohol use: Yes     Comment: once or twice per week    Drug use: No    Sexual activity: Yes     Partners: Male   Other Topics Concern    Caffeine Concern Yes     Comment: coffee, 1 cup daily       Available pre-op labs reviewed.  Lab Results   Component Value Date    URINEPREG Negative 06/18/2024             Vital Signs:  Body mass index is 21.63 kg/m².   height is 1.676 m (5' 6\") and weight is 60.8 kg (134 lb). Her oral temperature is 98.9 °F (37.2 °C). Her blood pressure is 130/82 and her pulse is 74. Her respiration is 18 and oxygen saturation is 100%.   Vitals:    06/11/24 1225 06/18/24 1216   BP:  130/82   Pulse:  74   Resp:  18   Temp:  98.9 °F (37.2 °C)   TempSrc:  Oral   SpO2:  100%   Weight: 59 kg (130 lb) 60.8 kg (134 lb)   Height: 1.676 m (5' 6\") 1.676 m (5' 6\")        Anesthesia Evaluation     Patient summary reviewed and Nursing notes reviewed    No history of anesthetic complications   Airway   Mallampati: II  TM distance: >3 FB  Neck ROM: full  Dental      Pulmonary - negative ROS    breath sounds clear to auscultation  (-) COPD, asthma, sleep apnea  Cardiovascular - negative ROS  Exercise tolerance: good  (-) hypertension, CAD, CHF    Rhythm: regular  Rate: normal    Neuro/Psych - negative ROS   (-) CVA    GI/Hepatic/Renal - negative ROS   (-) GERD, liver disease, renal disease    Endo/Other - negative ROS   (-) diabetes mellitus, hypothyroidism  Abdominal                  Anesthesia Plan:   ASA:  1  Plan:   General  Airway:   LMA  Post-op Pain Management: IV analgesics and Oral pain medication  Informed Consent Plan and Risks Discussed With:  Patient      I have informed Rosa Paz and/or legal guardian or family member of the nature of the anesthetic plan, benefits, risks including possible dental damage if relevant, major complications, and any alternative forms of anesthetic management.   All of the patient's questions were answered to the best of my ability. The patient desires the anesthetic management as planned.  Manny Wiggins MD  6/18/2024 9:10 AM  Present on Admission:   Menorrhagia with irregular cycle

## 2024-06-18 NOTE — ANESTHESIA POSTPROCEDURE EVALUATION
Patient: Rosa Paz    Procedure Summary       Date: 06/18/24 Room / Location: [facility] MAIN OR 07 / [facility] MAIN OR    Anesthesia Start: 1332 Anesthesia Stop:     Procedure: diagniostic hysteroscopy with dilation and curettage (Uterus) Diagnosis:       Menometrorrhagia      (Menometrorrhagia [N92.1])    Surgeons: Damaso Whitfield DO Anesthesiologist:     Anesthesia Type: general ASA Status: 1            Anesthesia Type: general    Vitals Value Taken Time   /72 06/18/24 1409   Temp 97.5 06/18/24 1409   Pulse 73 06/18/24 1409   Resp 15 06/18/24 1409   SpO2 99 06/18/24 1409       [facility] AN Post Evaluation:   Patient Evaluated in PACU  Patient Participation: complete - patient participated  Level of Consciousness: awake and alert  Pain Score: 0  Pain Management: adequate  Airway Patency:patent  Dental exam unchanged from preop  Yes    Nausea/Vomiting: none  Cardiovascular Status: acceptable  Respiratory Status: acceptable  Postoperative Hydration acceptable      SADIQ OROZCO CRNA  6/18/2024 2:09 PM

## 2024-06-18 NOTE — INTERVAL H&P NOTE
Pre-op Diagnosis: Menometrorrhagia [N92.1]    The above referenced H&P was reviewed by Damaso Whitfield DO on 6/18/2024, the patient was examined and no significant changes have occurred in the patient's condition since the H&P was performed.  I discussed with the patient and/or legal representative the potential benefits, risks and side effects of this procedure; the likelihood of the patient achieving goals; and potential problems that might occur during recuperation.  I discussed reasonable alternatives to the procedure, including risks, benefits and side effects related to the alternatives and risks related to not receiving this procedure.  We will proceed with procedure as planned.

## 2024-06-18 NOTE — H&P
[facility]  part of [facility]    History and Physical    Rosa Paz Patient Status:  Hospital Outpatient Surgery    1985 MRN [id number]   Location [facility] OPERATING ROOM Attending Damaso Whitfield,    Hosp Day # 0 PCP Cuauhtemoc Au MD     Date:  2024  Date of Admission:  24    History provided by:patient  HPI:   No chief complaint on file.    HPI  Rosa is a 38 y/o   with irregular cycles q 15-20d / variable flow x 8 days. She had the 15 day interval this month. Normal US in May. We had discussed endometrial sampling if persistent irregularities and this was performed on 2023. Results were benign and she elected to forego any treatment at that time. We spoke again in May regarding persistent menorrhagia with irregular interval. I asked her to repeat ultrasound which was normal except for a 12 mm intramural fibroid. This was unchanged from May 2023.     History     Past Medical History:    Anemia    Chicken pox    Foot fracture, left    Hx of motion sickness     Past Surgical History:   Procedure Laterality Date    Colonoscopy  2013    Egd scope  2013    Needle biopsy left Left 2023    us guided bx    Needle biopsy right Right 2023    us guided bx      2016 & 18    [place] teeth removed       Family History   Problem Relation Age of Onset    Heart Attack Mother 50        myocardial infarction    Heart Disease Mother 55        CAD, had triple bypass in     Eye Problems Father         ophthamology issues    Pancreatic Cancer Maternal Grandmother         around age 80    Cancer Maternal Grandmother         pancreatic    Cancer Maternal Grandfather         lung-smoker     Social History:  Social History     Socioeconomic History    Marital status:     Number of children: 1   Occupational History    Occupation: Homemaker    Occupation: Marketing     Comment: maternity leave   Tobacco Use     Smoking status: Never    Smokeless tobacco: Never   Vaping Use    Vaping status: Never Used   Substance and Sexual Activity    Alcohol use: Yes     Comment: once or twice per week    Drug use: No    Sexual activity: Yes     Partners: Male   Other Topics Concern    Caffeine Concern Yes     Comment: coffee, 1 cup daily     Allergies/Medications:   Allergies: No Known Allergies  No medications prior to admission.       Review of Systems:   Review of Systems  Constitutional:  Negative for appetite change, fatigue and unexpected weight change.   Eyes:  Negative for visual disturbance.   Respiratory:  Negative for cough and shortness of breath.    Cardiovascular:  Negative for chest pain, palpitations and leg swelling.   Gastrointestinal:  Negative for abdominal distention, abdominal pain, blood in stool, constipation and diarrhea.   Genitourinary:  Positive for menstrual problem. Negative for dyspareunia, dysuria, frequency, pelvic pain and urgency.   Musculoskeletal:  Negative for arthralgias and myalgias.   Skin:  Negative for rash.   Neurological:  Negative for weakness, numbness and headaches.   Psychiatric/Behavioral:  Negative for dysphoric mood. The patient is not nervous/anxious.   Physical Exam:   Vital Signs:  Height 5' 6\" (1.676 m), weight 130 lb (59 kg), last menstrual period 06/08/2024, not currently breastfeeding.  Physical Exam  Constitutional:       General: She is not in acute distress.     Appearance: Normal appearance. She is normal weight. She is not ill-appearing.   Chest:      Comments: Bilateral breasts without skin / nipple changes, mass, tenderness or axillary adenopathy.     Genitourinary:     Comments: EG, vagina and cervix w/o lesions. Uterus NSSC and no adnexal mass / tenderness.      Cervical Papanicolaou 02- with negative cytology and human papillomavirus testing.      Results:     Lab Results   Component Value Date    WBC 5.6 07/18/2023    HGB 12.5 07/18/2023    HCT 38.2 07/18/2023     .0 07/18/2023    CREATSERUM 0.68 07/18/2023    BUN 11 07/18/2023     07/18/2023    K 4.1 07/18/2023     07/18/2023    CO2 24.0 07/18/2023    GLU 82 07/18/2023    CA 9.1 07/18/2023    ALB 3.7 07/18/2023    ALKPHO 36 (L) 07/18/2023    BILT 0.5 07/18/2023    TP 6.8 07/18/2023    AST 15 07/18/2023    ALT 17 07/18/2023    T4F 0.8 07/18/2023    TSH 2.270 07/18/2023     No results found.        Assessment/Plan:     Menorrhagia with irregular cycle      PLAN: We discussed operative hysteroscopy with TruClear endometrial sampling followed by D and C. The procedure with it's indications, risks and complications was discussed. These include but are not limited to: bleeding, infection, injury and VTE. Any of these could require further medical and / or surgical therapy.  We discussed prophylactic measures as well. Questions answered and consent obtained.         Damaso Whitfield, DO  6/17/2024

## 2024-06-18 NOTE — BRIEF OP NOTE
Pre-Operative Diagnosis: Menometrorrhagia [N92.1]     Post-Operative Diagnosis: Menometrorrhagia [N92.1]      Procedure Performed:   diagniostic hysteroscopy with dilation and curettage    Surgeons and Role:     * Damaso Whitfield DO - Primary    Assistant(s):        Surgical Findings: Normal endometrium and tubal ostia. No lesions.      Specimen: endometrial curettings     Estimated Blood Loss: 5 ml    Dictation Number:  [id number]    Damaso Whitfield DO  6/18/2024  2:04 PM

## 2024-06-18 NOTE — ANESTHESIA PROCEDURE NOTES
Airway  Date/Time: 6/18/2024 1:38 PM  Urgency: elective      General Information and Staff    Patient location during procedure: OR  Resident/CRNA: Kendra Parrish CRNA  Performed: CRNA   Performed by: Kendra Parrish CRNA  Authorized by: Kendra Parrish CRNA      Indications and Patient Condition  Indications for airway management: anesthesia  Spontaneous ventilation: present  Sedation level: deep  Preoxygenated: yes  Patient position: sniffing  MILS maintained throughout  Mask difficulty assessment: 1 - vent by mask  No planned trial extubation    Final Airway Details  Final airway type: supraglottic airway      Successful airway: classic  Size 4       Number of attempts at approach: 1  Number of other approaches attempted: 0

## 2024-06-19 NOTE — OPERATIVE REPORT
[facility]    PATIENT'S NAME: MARSHA SANDOVAL   ATTENDING PHYSICIAN: Damaso Osman DO   OPERATING PHYSICIAN: Damaso Osman DO   PATIENT ACCOUNT#:   [id number]    LOCATION:  [facility] [address] [facility]  MEDICAL RECORD #:   [id number]       YOB: 1985  ADMISSION DATE:       06/18/2024      OPERATION DATE:  06/18/2024    OPERATIVE REPORT      PREOPERATIVE DIAGNOSIS:  Menometrorrhagia.  POSTOPERATIVE DIAGNOSIS:  Menometrorrhagia.  PROCEDURE:  Diagnostic hysteroscopy, followed by dilation and curettage.    ANESTHESIA:  General/LMA.    ESTIMATED BLOOD LOSS:  5 mL.    COMPLICATIONS:  None apparent.    PATHOLOGY SPECIMEN:  Endometrial curettings.    FINDINGS:  External genitalia, vaginal mucosa, and cervix are without lesions.  Hysteroscopic findings show a normal mildly proliferative endometrium with normal tubal ostia and no lesion seen.      OPERATIVE TECHNIQUE:  After consent was obtained, patient was taken to the operating suite where general anesthetic was administered.  She was prepped and draped in the usual fashion.  Next, a bimanual exam was performed and a single-tooth tenaculum placed on the anterior lip of the cervix.  Cervix was dilated to 6 mm, and this was followed by diagnostic hysteroscopy with the aforementioned findings.  We then removed the scope, and I performed sharp curettage in a clockwise/counterclockwise fashion with return of a small amount of tissue and blood.  Scant bleeding was noted at the end of the procedure.  The single-tooth tenaculum was removed from the cervix.  I placed a ring forceps on this area for 1 minute and then released it without any bleeding noted at the tenaculum site.  All final counts were noted to be correct.  The patient was placed back in the supine position, awakened from anesthesia and transferred to postanesthesia care unit in stable condition.    Dictated By Damaso Osman DO  d: 06/18/2024 14:06:58  t: 06/18/2024  19:23:00  [facility] [id number]  [facility]/    cc: DO Cuauhtemoc Smallwood MD

## 2024-07-01 ENCOUNTER — OFFICE VISIT (OUTPATIENT)
Dept: OBGYN CLINIC | Facility: CLINIC | Age: 39
End: 2024-07-01

## 2024-07-01 VITALS
BODY MASS INDEX: 22 KG/M2 | HEART RATE: 70 BPM | SYSTOLIC BLOOD PRESSURE: 119 MMHG | WEIGHT: 133.63 LBS | DIASTOLIC BLOOD PRESSURE: 77 MMHG

## 2024-07-01 DIAGNOSIS — Z98.890 POST-OPERATIVE STATE: Primary | ICD-10-CM

## 2024-07-01 PROCEDURE — 99024 POSTOP FOLLOW-UP VISIT: CPT | Performed by: OBSTETRICS & GYNECOLOGY

## 2024-07-01 RX ORDER — MEDROXYPROGESTERONE ACETATE 10 MG/1
10 TABLET ORAL DAILY
Qty: 21 TABLET | Refills: 0 | Status: SHIPPED | OUTPATIENT
Start: 2024-07-01 | End: 2024-07-22

## 2024-08-05 NOTE — PROGRESS NOTES
POST OP EXAM:  No complaints with respect to bleeding, pain, bowel or bladder.     PE: EG, vagina and cervix w/o lesions. Uterus NSSC and no adnexal mass / tenderness.     IMP: Normal PO exam    PLAN: Guidance on bleeding. We can still consider Mirena in office if irregularities worsen.

## 2024-08-26 ENCOUNTER — OFFICE VISIT (OUTPATIENT)
Dept: FAMILY MEDICINE CLINIC | Facility: CLINIC | Age: 39
End: 2024-08-26

## 2024-08-26 VITALS
SYSTOLIC BLOOD PRESSURE: 100 MMHG | HEART RATE: 64 BPM | RESPIRATION RATE: 16 BRPM | TEMPERATURE: 98 F | HEIGHT: 66 IN | DIASTOLIC BLOOD PRESSURE: 60 MMHG | BODY MASS INDEX: 22.02 KG/M2 | WEIGHT: 137 LBS

## 2024-08-26 DIAGNOSIS — Z00.00 ROUTINE PHYSICAL EXAMINATION: Primary | ICD-10-CM

## 2024-08-26 PROCEDURE — 99396 PREV VISIT EST AGE 40-64: CPT | Performed by: FAMILY MEDICINE

## 2024-08-26 NOTE — PROGRESS NOTES
Subjective:   Patient ID: Rosa Paz is a 39 year old female.    Patient is here for routine physical exam. No acute issues. No significant chronic medical problems.   Patient is requesting testing. Diet and exercise have been good.   Past medical history, family history, and social history were reviewed.  Family hx of heart disease. Has CT calcium heart scan. Calcium score 0 but did have pulmonary nodule. 6mm. Pt has never smoked and no symptoms. After discussion, pt elected to not repeat CT scan at this time as low risk.           History/Other:   Review of Systems   Constitutional: Negative.  Negative for fever.   HENT: Negative.     Eyes: Negative.    Respiratory: Negative.  Negative for cough and shortness of breath.    Cardiovascular: Negative.  Negative for chest pain.   Gastrointestinal: Negative.    Endocrine: Negative.    Genitourinary: Negative.    Musculoskeletal: Negative.    Skin: Negative.    Allergic/Immunologic: Negative.    Neurological: Negative.    Hematological: Negative.    Psychiatric/Behavioral: Negative.       No current outpatient medications on file.     Allergies:No Known Allergies    Objective:   Physical Exam  Constitutional:       Appearance: Normal appearance. She is well-developed.   HENT:      Head: Normocephalic and atraumatic.      Right Ear: Tympanic membrane and external ear normal.      Left Ear: Tympanic membrane and external ear normal.      Nose: Nose normal.      Mouth/Throat:      Mouth: Mucous membranes are moist.      Pharynx: No posterior oropharyngeal erythema.   Eyes:      Conjunctiva/sclera: Conjunctivae normal.   Neck:      Thyroid: No thyroid mass, thyromegaly or thyroid tenderness.   Cardiovascular:      Rate and Rhythm: Normal rate and regular rhythm.      Pulses: Normal pulses.      Heart sounds: Normal heart sounds.   Pulmonary:      Effort: Pulmonary effort is normal.      Breath sounds: Normal breath sounds.   Abdominal:      General: Abdomen is flat.  There is no distension.      Palpations: Abdomen is soft.      Tenderness: There is no abdominal tenderness. There is no guarding or rebound.   Genitourinary:     Vagina: Normal.   Musculoskeletal:         General: Normal range of motion.      Cervical back: Normal range of motion and neck supple.   Lymphadenopathy:      Cervical: No cervical adenopathy.   Skin:     General: Skin is warm.   Neurological:      General: No focal deficit present.      Mental Status: She is alert and oriented to person, place, and time.      Deep Tendon Reflexes: Reflexes are normal and symmetric. Reflexes normal.   Psychiatric:         Mood and Affect: Mood normal.         Behavior: Behavior normal.         Thought Content: Thought content normal.         Judgment: Judgment normal.         Assessment & Plan:   1. Routine physical examination: reviewed results of heart scan- no symptoms  - Exam is unremarkable. Screening tests were discussed, and after discussion, will check lab work as below. Healthy diet, exercise, and weight were discussed. To call if problems and follow up and further management after testing. Routine follow up with gyne for well woman exam.       Orders Placed This Encounter   Procedures    Assay, Thyroid Stim Hormone    Lipid Panel    Comp Metabolic Panel (14)    CBC, Platelet; No Differential       Meds This Visit:  Requested Prescriptions      No prescriptions requested or ordered in this encounter       Imaging & Referrals:  None

## 2024-08-28 ENCOUNTER — LAB ENCOUNTER (OUTPATIENT)
Dept: LAB | Age: 39
End: 2024-08-28
Attending: FAMILY MEDICINE
Payer: COMMERCIAL

## 2024-08-28 DIAGNOSIS — Z00.00 ROUTINE PHYSICAL EXAMINATION: ICD-10-CM

## 2024-08-28 LAB
ALBUMIN SERPL-MCNC: 4.5 G/DL (ref 3.2–4.8)
ALBUMIN/GLOB SERPL: 1.9 {RATIO} (ref 1–2)
ALP LIVER SERPL-CCNC: 38 U/L
ALT SERPL-CCNC: 9 U/L
ANION GAP SERPL CALC-SCNC: 6 MMOL/L (ref 0–18)
AST SERPL-CCNC: 20 U/L (ref ?–34)
BILIRUB SERPL-MCNC: 0.7 MG/DL (ref 0.3–1.2)
BUN BLD-MCNC: 10 MG/DL (ref 9–23)
BUN/CREAT SERPL: 14.7 (ref 10–20)
CALCIUM BLD-MCNC: 9.6 MG/DL (ref 8.7–10.4)
CHLORIDE SERPL-SCNC: 110 MMOL/L (ref 98–112)
CHOLEST SERPL-MCNC: 158 MG/DL (ref ?–200)
CO2 SERPL-SCNC: 25 MMOL/L (ref 21–32)
CREAT BLD-MCNC: 0.68 MG/DL
DEPRECATED RDW RBC AUTO: 45 FL (ref 35.1–46.3)
EGFRCR SERPLBLD CKD-EPI 2021: 112 ML/MIN/1.73M2 (ref 60–?)
ERYTHROCYTE [DISTWIDTH] IN BLOOD BY AUTOMATED COUNT: 13.9 % (ref 11–15)
FASTING PATIENT LIPID ANSWER: YES
FASTING STATUS PATIENT QL REPORTED: YES
GLOBULIN PLAS-MCNC: 2.4 G/DL (ref 2–3.5)
GLUCOSE BLD-MCNC: 93 MG/DL (ref 70–99)
HCT VFR BLD AUTO: 36.6 %
HDLC SERPL-MCNC: 57 MG/DL (ref 40–59)
HGB BLD-MCNC: 11.8 G/DL
LDLC SERPL CALC-MCNC: 89 MG/DL (ref ?–100)
MCH RBC QN AUTO: 28.5 PG (ref 26–34)
MCHC RBC AUTO-ENTMCNC: 32.2 G/DL (ref 31–37)
MCV RBC AUTO: 88.4 FL
NONHDLC SERPL-MCNC: 101 MG/DL (ref ?–130)
OSMOLALITY SERPL CALC.SUM OF ELEC: 291 MOSM/KG (ref 275–295)
PLATELET # BLD AUTO: 229 10(3)UL (ref 150–450)
POTASSIUM SERPL-SCNC: 4.1 MMOL/L (ref 3.5–5.1)
PROT SERPL-MCNC: 6.9 G/DL (ref 5.7–8.2)
RBC # BLD AUTO: 4.14 X10(6)UL
SODIUM SERPL-SCNC: 141 MMOL/L (ref 136–145)
TRIGL SERPL-MCNC: 58 MG/DL (ref 30–149)
TSI SER-ACNC: 2.52 MIU/ML (ref 0.55–4.78)
VLDLC SERPL CALC-MCNC: 9 MG/DL (ref 0–30)
WBC # BLD AUTO: 4.8 X10(3) UL (ref 4–11)

## 2024-08-28 PROCEDURE — 80053 COMPREHEN METABOLIC PANEL: CPT

## 2024-08-28 PROCEDURE — 80061 LIPID PANEL: CPT

## 2024-08-28 PROCEDURE — 85027 COMPLETE CBC AUTOMATED: CPT

## 2024-08-28 PROCEDURE — 36415 COLL VENOUS BLD VENIPUNCTURE: CPT

## 2024-08-28 PROCEDURE — 84443 ASSAY THYROID STIM HORMONE: CPT

## 2024-10-28 ENCOUNTER — OFFICE VISIT (OUTPATIENT)
Dept: OBGYN CLINIC | Facility: CLINIC | Age: 39
End: 2024-10-28
Payer: COMMERCIAL

## 2024-10-28 ENCOUNTER — TELEPHONE (OUTPATIENT)
Dept: HEMATOLOGY/ONCOLOGY | Facility: HOSPITAL | Age: 39
End: 2024-10-28

## 2024-10-28 VITALS
BODY MASS INDEX: 21 KG/M2 | HEART RATE: 77 BPM | DIASTOLIC BLOOD PRESSURE: 79 MMHG | SYSTOLIC BLOOD PRESSURE: 115 MMHG | WEIGHT: 133 LBS

## 2024-10-28 DIAGNOSIS — R92.343 EXTREMELY DENSE TISSUE OF BOTH BREASTS ON MAMMOGRAPHY: ICD-10-CM

## 2024-10-28 DIAGNOSIS — N93.9 ABNORMAL UTERINE BLEEDING: Primary | ICD-10-CM

## 2024-10-28 PROCEDURE — 99214 OFFICE O/P EST MOD 30 MIN: CPT | Performed by: NURSE PRACTITIONER

## 2024-10-28 NOTE — TELEPHONE ENCOUNTER
Call received from patient inquiring as to an appointment with the [facility].  Introduced myself as Breast Nurse Navigator.  Provided patient with information for services provided in the clinic.  Family history reviewed.  Patient's grandmother with history of pancreatic cancer.  Shared with patient that she meets classic criteria to consider genetic testing.  Questions answered.  Patient is interested in scheduling an appointment with Genetic Counselor.  Appointment scheduled for 11/6/24 at 9am.    Shared with patient she will receive a call from Surgical Oncology concerning appointment for High Risk.  Patient was seen by her provider today and discussed supplemental breast imaging for her breast density with MRI.  Encouraged patient meet with High Risk provider for formalized risk assessment and discuss breast imaging recommendations further.  Patient acknowledged and was appreciative.

## 2024-10-29 ENCOUNTER — TELEPHONE (OUTPATIENT)
Dept: OBGYN CLINIC | Facility: CLINIC | Age: 39
End: 2024-10-29

## 2024-10-29 NOTE — TELEPHONE ENCOUNTER
Pt is a pt of [facility] and stated that for the last 2 years her cycles have been irregular. Pt stated she had EMBXs and pelvic USs done and everything was normal. Pt stated that [facility] had told her that the only option to control her irregular bleeding would be to start birth control--specifically the mirena IUD. Pt stated that she was not on board at the time with having the IUD placed because she likes to be more natural and didn't want a medication. Pt stated that her periods continue to be irregular and last for about 12 days and then return again 12 days later. Pt stated that the way [facility] left it with her was that she can have the mirena placed if she wanted but it wasn't medically necessary. Pt stated that [facility] said it would just be more of an inconvenience for her to continue to have the irregular bleeding but it wouldn't be \"risky\".     Pt saw [facility] yetserday to get a second opinion on the IUD. Pt stated that [facility] was on the same page as [facility] that she can get the IUD placed if she wants. Pt stated that [facility] then reviewed pts EMBX results from December 2023 and due to the disordered proliferative endometrium she had suggested that this could be more risky if she DOESN'T have birth control on board. Pt would like Rebekah opinion on this. Pt stated that if [facility] thinks she is at risk for pre-cancer, cancer, or other health issues if she does not have IUD placed , then she will have it placed. Message to [facility] for recs.

## 2024-10-29 NOTE — TELEPHONE ENCOUNTER
Patient saw Destiny yesterday. Her recommendations matched Dr Whitfield's to get the Mirena IUD but seemed more medically necessary than what Dr Whitfield indicated previously. Patient would like to discuss further. Please call.

## 2024-11-06 ENCOUNTER — GENETICS ENCOUNTER (OUTPATIENT)
Dept: GENETICS | Facility: HOSPITAL | Age: 39
End: 2024-11-06
Attending: FAMILY MEDICINE
Payer: COMMERCIAL

## 2024-11-06 ENCOUNTER — APPOINTMENT (OUTPATIENT)
Dept: HEMATOLOGY/ONCOLOGY | Facility: HOSPITAL | Age: 39
End: 2024-11-06
Attending: FAMILY MEDICINE
Payer: COMMERCIAL

## 2024-11-06 DIAGNOSIS — Z80.0 FAMILY HISTORY OF PANCREATIC CANCER: Primary | ICD-10-CM

## 2024-11-06 PROCEDURE — 96040 HC GENETIC COUNSELING EA 30 MIN: CPT | Performed by: GENETIC COUNSELOR, MS

## 2024-11-06 NOTE — PROGRESS NOTES
Reason for visit: Mrs. Paz is a 39-year-old woman who was referred for genetic counseling due to her family history of pancreatic cancer.  She was scheduled today for genetic counseling and a discussion about genetic testing due to this family history.  She is  and is not currently employed outside of the home.  She and her  have three daughters together.  Referring MD: PRERNA Kirby  Relevant family history:  Mrs. Paz's maternal grandmother, a smoker, passed away from pancreatic cancer and her maternal grandfather, also a smoker, passed away from lung cancer.  She is otherwise unaware of malignancies on her maternal side of her family.  On her paternal side, her paternal grandfather passed away at age 41 from colon cancer.  She is otherwise unaware of malignancies on her paternal side.  She does not believe that any family members to date have pursued genetic testing.  She is of [place] and [place] descent on her maternal side of her family and of [place] descent on her paternal side of her family and denies any consanguinity or Ashkenazi Protestant heritage.   Medical history: Mrs. Paz has been consistent with her breast imaging and has a personal history of two benign breast biopsies.  She is aware that she has class D density of her breasts.    Mrs. Paz has her ovaries and her uterus intact and is pre-menopausal.  She has had a colonoscopy at age 25 with normal results but has not had any further exams.  She denies any thyroid issues, dermatological concerns or uterine fibroids.   Other medical history of note: Mrs. Paz was 11 at menarche and was 31 at the birth of her eldest daughter.  She denies the use of any hormone replacement therapy or oral contraceptive use.    She considers herself to be in overall good health. She denies any tobacco use and denies any consumption of alcoholic beverages.  Her daughters are healthy and well by her report.  Summary:   We discussed hereditary cancer  with Mrs. Paz because of her family history.  Most breast or ovarian cancer is not hereditary; however, hereditary cancer is suspected under certain conditions, such as when breast cancer occurs prior to the age of 50 (or premenopausal), when there are multiple affected family members, when breast cancer occurs in combination with other cancers, especially ovarian cancer, and when cancer appears to be passing from generation to generation.  We did review the new information about 10% of cases of pancreatic cancer having a hereditary nature to them, regardless of family history.      We discussed dominant inheritance, the pattern in which most hereditary cancer conditions are inherited.  If an individual has such a cancer predisposing gene mutation, there is a 50% chance that any offspring will not inherit the mutation and a 50% chance that he or she will inherit it.  Inheriting the mutation does not automatically mean that one will develop cancer, rather that there is an increased chance for developing certain types of cancer.  Cancer predisposing gene mutations can exist in males and females and can be passed on to both male and female offspring.  The pros and cons of cancer predisposing gene mutation testing were reviewed with Mrs. Paz.  Genetic test results can have significant impact on medical management, planning, screening, surgical decision-making, cancer risk assessment for the patient and relatives, and psychological/emotional well-being.  Mutations in the genes BRCA1 and BRCA2 account for most (but not all) cases of hereditary breast cancer.  Mutations in other genes have also been associated with an increased risk for breast cancer - but mutations in these other genes are statistically less often seen in hereditary breast cancer, but may be seen with hereditary ovarian or pancreatic cancer.  We discussed the benefits and limitations of BRCA1/2 testing versus multi-gene panels that include BRCA1/2.   Panels are an appropriate option for individuals whose history is suggestive of more than one syndrome, and they improve detection rate for identifying the underlying cause of a hereditary cancer.  Limitations of panels include an unknown percentage of variants of unknown significance, as well as an uncertainty of level of risk associated with certain unknown-penetrance genes, and therefore lack of clear guidelines for risk management of carriers of some of these mutations.  There are panels that assess for mutations in only “high risk” and clinically actionable cancer genes as well as larger panels that assess for mutations in high, moderate and unknown-penetrance cancer genes.  Genetic testing could yield one of three results: no mutation detected, deleterious mutation detected, or variant of uncertain significance.  The implications of these potential results were reviewed with Mrs. Paz.  The optimal testing strategy is to test an affected family member first.  We discussed the limitations of interpreting test results of an unaffected individual.  Specifically, a negative result in an unaffected individual is uninformative unless a known mutation has been identified in an affected relative.  Unfortunately all of her affected relatives have passed away and this is not an option.  Another option is testing Mrs. Paz rather than an affected relative, and we reviewed the limitations of this testing, including concerns about discrimination by life insurers, long term healthcare or disability, which are not covered by statutes yet.    Given her family history of pancreatic cancer and early-onset colon cancer, she does meet NCCN criteria for genetic testing on BOTH sides of her family.    After discussing the multiple testing options, Mrs. Paz decided that she would like to continue to consider the option of pursuing molecular genetic testing for hereditary cancer and wants to discuss this with her family members.   She plans to contact me in the near future if she decides to move forward with this option.   She plans to meet with the [facility] group in January to review her personal risks and breast composition.  She is aware that given her family history of early-onset colon cancer, she should be having colonoscopies and will discuss this with her primary care physician.  Plan:  Mrs. Paz will plan to consider the option of genetic testing. If she elects to pursue this testing, our office is happy to coordinate this process.  She will meet with the [facility] team to discuss imaging modalities and a personalized risk assessment.  She will speak to her PCP about a referral for a GI specialist given the early-onset colon cancer in her paternal grandfather.  Thank you for referring Mrs. Paz for genetic counseling; please do not hesitate to contact our office if you have any questions or concerns, [phone].  Send to: PRERNA Kirby  Time spent with patient: 70 minutes

## 2024-12-12 ENCOUNTER — OFFICE VISIT (OUTPATIENT)
Dept: OBGYN CLINIC | Facility: CLINIC | Age: 39
End: 2024-12-12

## 2024-12-12 VITALS
HEART RATE: 80 BPM | BODY MASS INDEX: 22 KG/M2 | WEIGHT: 134.13 LBS | SYSTOLIC BLOOD PRESSURE: 126 MMHG | DIASTOLIC BLOOD PRESSURE: 78 MMHG

## 2024-12-12 DIAGNOSIS — N92.1 MENORRHAGIA WITH IRREGULAR CYCLE: ICD-10-CM

## 2024-12-12 DIAGNOSIS — Z01.411 ENCOUNTER FOR GYNECOLOGICAL EXAMINATION WITH ABNORMAL FINDING: Primary | ICD-10-CM

## 2024-12-12 PROCEDURE — 99396 PREV VISIT EST AGE 40-64: CPT | Performed by: OBSTETRICS & GYNECOLOGY

## 2024-12-13 ENCOUNTER — TELEPHONE (OUTPATIENT)
Dept: OBGYN CLINIC | Facility: CLINIC | Age: 39
End: 2024-12-13

## 2024-12-16 NOTE — TELEPHONE ENCOUNTER
[facility] called regarding below.   Patient has entered a ticket because she cannot see her office visit notes.

## 2024-12-16 NOTE — TELEPHONE ENCOUNTER
Spoke with Kylie at [facility]. Patient has called twice for medical records for 12/12/24 visit, states needs for insurance purposes.    Notified Kylie that note not yet complete, she can direct patient to follow up with us for status updates.     To Dr. Whitfield- patient attempting to view notes for annual on 12/12/24.

## 2024-12-16 NOTE — PROGRESS NOTES
Subjective:   Patient ID: Rosa Paz is a 39 year old female.    HPI   and S/P hysteroscopy by me in . Path was benign as expected. We had discussed treatment options and she elected to observe. Since then she had 2 cycles on a 21d interval. She then had 2 cycles in September and then random spotting in October. She then had onset menses in late  x 7d and now daily spotting since. No new family or personal medical issues. She saw Gita Collier for family history of pancreatic CA. She also saw Destiny Hunter in late October and supplemental breast MRI ordered.     History/Other:   Review of Systems   Constitutional:  Negative for appetite change, fatigue and unexpected weight change.   Eyes:  Negative for visual disturbance.   Respiratory:  Negative for cough and shortness of breath.    Cardiovascular:  Negative for chest pain, palpitations and leg swelling.   Gastrointestinal:  Negative for abdominal distention, abdominal pain, blood in stool, constipation and diarrhea.   Genitourinary:  Positive for menstrual problem. Negative for dyspareunia, dysuria, frequency, pelvic pain and urgency.   Musculoskeletal:  Negative for arthralgias and myalgias.   Skin:  Negative for rash.   Neurological:  Negative for weakness, numbness and headaches.   Psychiatric/Behavioral:  Negative for dysphoric mood. The patient is not nervous/anxious.      No current outpatient medications on file.     Allergies:Allergies[1]    Objective:   Physical Exam  Constitutional:       General: She is not in acute distress.     Appearance: She is well-developed. She is not diaphoretic.   Neck:      Thyroid: No thyromegaly.   Cardiovascular:      Rate and Rhythm: Normal rate and regular rhythm.      Heart sounds: Normal heart sounds. No murmur heard.  Pulmonary:      Effort: Pulmonary effort is normal.      Breath sounds: Normal breath sounds. No wheezing or rales.   Chest:   Breasts:     Right: Normal. No mass, nipple  discharge, skin change or tenderness.      Left: Normal. No mass, nipple discharge, skin change or tenderness.      Comments:     Abdominal:      General: There is no distension.      Palpations: Abdomen is soft. There is no mass.      Tenderness: There is no abdominal tenderness. There is no guarding or rebound.   Genitourinary:     Labia:         Right: No rash or lesion.         Left: No rash or lesion.       Vagina: Normal. No vaginal discharge.      Cervix: No cervical motion tenderness or discharge.      Uterus: Not enlarged and not tender.       Adnexa:         Right: No mass, tenderness or fullness.          Left: No mass, tenderness or fullness.     Musculoskeletal:         General: No tenderness.      Cervical back: Neck supple.   Lymphadenopathy:      Cervical: No cervical adenopathy.      Upper Body:      Right upper body: No supraclavicular, axillary or pectoral adenopathy.      Left upper body: No supraclavicular, axillary or pectoral adenopathy.   Neurological:      Mental Status: She is alert.         Assessment & Plan:   1. Encounter for gynecological examination with abnormal finding    2. Menorrhagia with irregular cycle        PLAN: We again discussed options for menstrual control. I would avoid Oral contraceptive and still recommend consideration for Mirena IUD. She has a single 12 mm fibroid and I would expect the Mirena to help her bleeding pattern. She will consider but does not want it at this time.     Meds This Visit:  Requested Prescriptions      No prescriptions requested or ordered in this encounter       Imaging & Referrals:  None         [1] No Known Allergies

## 2024-12-19 ENCOUNTER — TELEPHONE (OUTPATIENT)
Dept: OBGYN CLINIC | Facility: CLINIC | Age: 39
End: 2024-12-19

## 2024-12-19 NOTE — TELEPHONE ENCOUNTER
Patient calling to schedule her IUD - mirena.  Patient bleeding all the time but would like to discuss & schedule.    Possibly in January

## 2024-12-19 NOTE — TELEPHONE ENCOUNTER
Patient discussed Mirena IUD with Dr. Whitfield at annual appointment 12/2024. Patient would like to proceed with scheduling IUD insertion. Reports she has irregular bleeding and does not know when is day 1 of a cycle. Patient looking to get appointment in January. Partner with vasectomy. Message to Dr. Whitfield to please advise if okay to schedule anytime?

## 2024-12-20 NOTE — TELEPHONE ENCOUNTER
Patient informed of Dr. Whitfield's recommendations. Patient agreed. Assisted patient in scheduling IUD insertion on 1/29/25 at 6pm with Dr. Whitfield. Patient advised to take 600mg of Ibuprofen 1 hr before her appointment. Patient agreed. All questions answered to patient's satisfaction. Patient advised she can discuss IUD options/ effectiveness further with Dr. Whitfield at her appointment. Patient verbalized understanding.

## 2024-12-21 ENCOUNTER — TELEPHONE (OUTPATIENT)
Dept: OBGYN CLINIC | Facility: CLINIC | Age: 39
End: 2024-12-21

## 2024-12-27 ENCOUNTER — TELEPHONE (OUTPATIENT)
Dept: OBGYN CLINIC | Facility: CLINIC | Age: 39
End: 2024-12-27

## 2024-12-27 NOTE — TELEPHONE ENCOUNTER
Rosa is scheduled for Mirena IUD insertion 1/31/25.   IUD was recommended for bleeding control.  has vasectomy, so not needed for birth control.    She like to take a more natural approach to medicine, so requesting options for lower dose IUD like Berta or Kyleena. She does not necessarily mind if she had periods or occasional breakthrough bleeding. She also states she does not really intend to keep Mirena for full 5 years for bleeding, so okay with different IUD with shorter efficacy.     To Dr. Whitfield to advise if another lower-hormone IUD may be an option for her.

## 2024-12-27 NOTE — TELEPHONE ENCOUNTER
Kyleena would be second choice. I do not recommend Berta as we are inserting this to control bleeding.

## 2024-12-27 NOTE — TELEPHONE ENCOUNTER
Patient called in to rescheduled the IUD appointment., patient also have questions regarding a lower dose of chayito

## 2024-12-27 NOTE — TELEPHONE ENCOUNTER
Patient informed of Dr. Whitfield's response.  Patient will discuss options with her  and call back with decision.

## 2025-01-06 ENCOUNTER — OFFICE VISIT (OUTPATIENT)
Dept: SURGERY | Facility: CLINIC | Age: 40
End: 2025-01-06
Payer: COMMERCIAL

## 2025-01-06 VITALS
HEART RATE: 71 BPM | DIASTOLIC BLOOD PRESSURE: 82 MMHG | WEIGHT: 135.81 LBS | RESPIRATION RATE: 16 BRPM | TEMPERATURE: 99 F | BODY MASS INDEX: 22 KG/M2 | SYSTOLIC BLOOD PRESSURE: 109 MMHG

## 2025-01-06 DIAGNOSIS — R92.343 EXTREMELY DENSE TISSUE OF BOTH BREASTS ON MAMMOGRAPHY: Primary | ICD-10-CM

## 2025-01-06 DIAGNOSIS — Z12.31 SCREENING MAMMOGRAM FOR BREAST CANCER: ICD-10-CM

## 2025-01-06 PROCEDURE — 99203 OFFICE O/P NEW LOW 30 MIN: CPT

## 2025-01-13 NOTE — PROGRESS NOTES
Breast Surgery New Patient Consultation    This is the first visit for this 40 year old woman, referred by Dr. Whitfield, who presents for evaluation of extremely dense breast tissue.    History of Present Illness:   Ms. Rosa Paz is a 40 year old woman who presents with extremely dense breast tissue on mammography. Most recent imaging was a bilateral screening mammogram on 2025 which showed no suspicious findings. She denies any palpable masses, nipple discharge, skin changes, or axillary adenopathy. She does not have breast pain. She does have significant past history for breast diseases or breast biopsy. She does not have family history of breast cancer. She is here today for evaluation and recommendations for further therapy.        Past Medical History:    Anemia    Chicken pox    Foot fracture, left    Hx of motion sickness       Past Surgical History:   Procedure Laterality Date    Colonoscopy  2013    Egd scope  2013    Needle biopsy left Left 2023    us guided bx    Needle biopsy right Right 2023    us guided bx      2016 & 18    Solo teeth removed         Gynecological History:  Pt is a   Pt was 31 years old at time of first pregnancy.    She has cumulative breastfeeding history of 42 months.  She achieved menarche at age 11 and LMP   She denies any history of hormone replacement therapy f  She denies any history of oral contraceptive use   She denies infertility treatment to achieve pregnancy.    Medications:    No outpatient medications have been marked as taking for the 25 encounter (Office Visit) with Caitlin Whitley APRN.       Allergies:    Allergies[1]    Family History:   Family History   Problem Relation Age of Onset    Eye Problems Father         ophthamology issues    Hypertension Mother     Heart Attack Mother 50        myocardial infarction    Heart Disease Mother 55        CAD, had triple bypass in     Cancer Maternal Grandmother  73        pancreatic; smoker    Pancreatic Cancer Maternal Grandmother 73    Cancer Maternal Grandfather         lung ca; smoker    Cancer Paternal Grandfather 40        colon ca       She is not of Ashkenazi Sikh ancestry.    Social History:  History   Alcohol Use    Yes     Comment: once or twice per week       History   Smoking Status    Never   Smokeless Tobacco    Never     Ms. Rosa Paz is  with 3 children. She has 4 siblings. She is currently Homemaker    Review of Systems:  General:   The patient denies, fever, chills, night sweats, fatigue, generalized weakness, change in appetite or weight loss.    HEENT:     The patient denies eye irritation, cataracts, redness, glaucoma, yellowing of the eyes, change in vision, color blindness, or wearing contacts/glasses. The patient denies hearing loss, ringing in the ears, ear drainage, earaches, nasal congestion, nose bleeds, snoring, pain in mouth/throat, hoarseness, change in voice, facial trauma.    Respiratory:  The patient denies chronic cough, phlegm, hemoptysis, pleurisy/chest pain, pneumonia, asthma, wheezing, difficulty in breathing with exertion, emphysema, chronic bronchitis, shortness of breath or abnormal sound when breathing.     Cardiovascular:  There is no history of chest pain, chest pressure/discomfort, palpitations, irregular heartbeat, fainting or near-fainting, difficulty breathing when lying flat, SOB/Coughing at night, swelling of the legs or chest pain while walking.    Breasts:  See history of present illness    Gastrointestinal:     There is no history of difficulty or pain with swallowing, reflux symptoms, vomiting, dark or bloody stools, constipation, yellowing of the skin, indigestion, nausea, change in bowel habits, diarrhea, abdominal pain or vomiting blood.     Genitourinary:  The patient denies frequent urination, needing to get up at night to urinate, urinary hesitancy or retaining urine, painful urination, urinary  incontinence, decreased urine stream, blood in the urine or vaginal/penile discharge.    Skin:    The patient denies rash, itching, skin lesions, dry skin, change in skin color or change in moles.     Hematologic/Lymphatic:  The patient denies easily bruising or bleeding or persistent swollen glands or lymph nodes.     Musculoskeletal:  The patient denies muscle aches/pain, joint pain, stiff joints, neck pain, back pain or bone pain.    Neuropsychiatric:  There is no history of migraines or severe headaches, seizure/epilepsy, speech problems, coordination problems, trembling/tremors, fainting/black outs, dizziness, memory problems, loss of sensation/numbness, problems walking, weakness, tingling or burning in hands/feet. There is no history of abusive relationship, bipolar disorder, sleep disturbance, anxiety, depression or feeling of despair.    Endocrine:    There is no history of poor/slow wound healing, weight loss/gain, fertility or hormone problems, cold intolerance, thyroid disease.     Allergic/Immunologic:  There is no history of hives, hay fever, angioedema or anaphylaxis.    /82 (BP Location: Right arm, Patient Position: Sitting, Cuff Size: adult)   Pulse 71   Temp 98.6 °F (37 °C) (Tympanic)   Resp 16   Wt 61.6 kg (135 lb 12.8 oz)   LMP 11/29/2024 (Exact Date)   BMI 21.92 kg/m²     Physical Exam:  The patient is an alert, oriented, well-nourished and  well-developed woman who appears her stated age. Her speech patterns and movements are normal. Her affect is appropriate.    HEENT: The head is normocephalic. The neck is supple. The thyroid is not enlarged and is without palpable masses/nodules. There are no palpable masses. The trachea is in the midline. Conjunctiva are clear, non-icteric.    Chest: The chest expands symmetrically. The lungs are clear to auscultation.    Heart: The rhythm is regular.  There are no murmurs, rubs, gallops or thrills.    Breasts:  Her breasts are symmetrical with  a cup size 32B.  Right breast: The skin, nipple ,and areola appear normal. There is no skin dimpling with movement of the pectoralis. There is no nipple retraction. No nipple discharge can be elicited. The parenchyma is mildly nodular. There are no dominant masses in the breast. The axillary tail is normal.  Left breast:   The skin, nipple, and areola appear normal. There is no skin dimpling with movement of the pectoralis. There is no nipple retraction. No nipple discharge can be elicited. The parenchyma is mildly nodular. There are no dominant masses in the breast. The axillary tail is normal.    Abdomen:  The abdomen is soft, flat and non tender. The liver is not enlarged. There are no palpable masses.    Lymph Nodes:  The supraclavicular, axillary and cervical regions are free of significant lymphadenopathy.    Back: There is no vertebral column tenderness.    Skin: The skin appears normal. There are no suspicious appearing rashes or lesions.    Extremities: The extremities are without deformity, cyanosis or edema.    Impression:   Ms. Rosa Paz is a 40 year old woman presents with extremely dense breast tissue on mammogram.     Discussion and Plan:  I had a discussion with the Patient regarding her breast exam. On exam today I found no evidence of malignancy bilaterally.  I personally reviewed her recent mammogram and we discussed this at length.    Due to her extremely dense breat tissue, I am recommending bilateral whole breast ultrasounds in addition to her yearly mammograms. We discussed for her to update me if there are any changes to her family history.  She was given ample opportunity for questions and those questions were answered to her satisfaction. She has been  encouraged to contact the office with any questions or concerns prior to her next appointment.     This encounter lasted a total of 30 minutes, more than 50% of which was dedicated to the discussion of management options.          This note was created by [employer] voice recognition. Errors in content may be related to improper recognition by the system; efforts to review and correct have been done but errors may still exist. Please be advised the primary purpose of this note is for me to communicate medical care. Standard sentence structure is not always used. Medical terminology and medical abbreviations may be used. There may be grammatical, typographical, and automated fill ins that may have errors missed in proofreading.           [1] No Known Allergies

## 2025-01-29 ENCOUNTER — TELEPHONE (OUTPATIENT)
Dept: OBGYN CLINIC | Facility: CLINIC | Age: 40
End: 2025-01-29

## 2025-01-29 NOTE — TELEPHONE ENCOUNTER
Patient is scheduled for an IUD insert on Friday. She is concerned that she will have her period and would like to get verification that this will be acceptable. Please call.

## 2025-01-29 NOTE — TELEPHONE ENCOUNTER
Rosa notified to keep appointment as scheduled. Was told okay to schedule anytime for IUD insert, but reassured that it is actually ideal to insert with a cycle, so not  a problem. Patient verbalized understanding.

## 2025-01-31 ENCOUNTER — OFFICE VISIT (OUTPATIENT)
Dept: OBGYN CLINIC | Facility: CLINIC | Age: 40
End: 2025-01-31

## 2025-01-31 VITALS
DIASTOLIC BLOOD PRESSURE: 75 MMHG | SYSTOLIC BLOOD PRESSURE: 107 MMHG | HEART RATE: 98 BPM | BODY MASS INDEX: 21 KG/M2 | WEIGHT: 130.38 LBS

## 2025-01-31 DIAGNOSIS — Z30.430 ENCOUNTER FOR INSERTION OF INTRAUTERINE CONTRACEPTIVE DEVICE (IUD): Primary | ICD-10-CM

## 2025-01-31 DIAGNOSIS — Z32.00 PREGNANCY EXAMINATION OR TEST, PREGNANCY UNCONFIRMED: ICD-10-CM

## 2025-01-31 LAB
CONTROL LINE PRESENT WITH A CLEAR BACKGROUND (YES/NO): YES YES/NO
KIT LOT #: NORMAL NUMERIC
PREGNANCY TEST, URINE: NEGATIVE

## 2025-01-31 PROCEDURE — 81025 URINE PREGNANCY TEST: CPT | Performed by: OBSTETRICS & GYNECOLOGY

## 2025-01-31 PROCEDURE — 58300 INSERT INTRAUTERINE DEVICE: CPT | Performed by: OBSTETRICS & GYNECOLOGY

## 2025-01-31 NOTE — PROGRESS NOTES
IUD Insertion     Pregnancy Results: negative from urine test   Birth control method(s) used: vasectomy    Consent signed.  Procedure discussed with the patient in detail including indication, risks, benefits, alternatives and complications.    Pelvic Exam Findings:  EG, vagina and cervix w/o lesions.       Procedure:  Speculum placed in the vagina.  Betadine wash of vagina and cervix.  Single tooth tenaculum was placed at the 12 o'clock position.  Cervical stenosis encountered. Os finder used to dilate cervix.  Uterus sounded to 7 cm.  Mirena IUD was placed without difficulty.  Strings cut at 3 cm.  Single tooth tenaculum removed.  Good hemostasis noted..  Patient tolerated procedure well.      Visit Plan:  IUD surveillance was discussed with the patient.

## 2025-02-20 ENCOUNTER — TELEPHONE (OUTPATIENT)
Dept: OBGYN CLINIC | Facility: CLINIC | Age: 40
End: 2025-02-20

## 2025-02-20 DIAGNOSIS — N92.1 BREAKTHROUGH BLEEDING ASSOCIATED WITH INTRAUTERINE DEVICE (IUD): Primary | ICD-10-CM

## 2025-02-20 DIAGNOSIS — Z97.5 BREAKTHROUGH BLEEDING ASSOCIATED WITH INTRAUTERINE DEVICE (IUD): Primary | ICD-10-CM

## 2025-02-20 NOTE — TELEPHONE ENCOUNTER
Patient had Mirena IUD inserted on 1/31/25 by Dr. Whitfield.     Patient calling to report she has been having heavy bleeding since IUD insertion. Patient reports the first week she had spotting, and then for the past 10 days she has had heavy bleeding (like a period flow), and has needed to change her pads every 2 hours. Patient reports she has passed about 1 pea-sized blood clot per day. She reports she had strong pelvic cramping 2 weeks ago for 2 days, but it has now subsided. She denies any pelvic cramping this time. She also reports feeling lightheaded, and exhausted lately, but states she has been sleeping fine. She reports she has had increased stress the past few weeks.    Patient states Dr. Whitfield told her during her last appt that a f/up for IUD is not needed, but patient is asking if she should have f/up appointment since she is having this type of bleeding.     Patient advised that it is not uncommon to experience irregular bleeding patterns for up to 3-6 months after Mirena IUD insertion as it may take time for her body to adjust to the new BC. Patient also informed an increase in stress can be a contributing factor to irregular bleeding as well. Patient advised ok to monitor bleeding at this time. Advised patient to make sure she is staying well hydrated with at least 64 oz of water daily. Pain/ bleeding/ ER precautions reviewed. RN offered f/up appointment on 3/13 with Dr. Whitfield at the [place] office. Patient accepts and states she will plan to cancel her appointment if her bleeding slows down or goes away. Patient informed a message will be routed to Dr. Whitfield for further review. Patient appreciative and verbalized understanding.     Message to Dr. Whitfield to please review and advise of any further recommendations and if patient should keep her appointment on 3/13 for IUD f/up? Thank you.

## 2025-02-20 NOTE — TELEPHONE ENCOUNTER
I called and spoke with Rosa. No pain that be suspicious for expulsion. We discussed two options. One is to check CBC and simply observe over next 1-2 weeks. Second is to add an oral medication ( progestin ) x 20 days and see if that stops the prolonged bleed. If persistent, she has appointment with me in mid-March. CBC order placed.

## 2025-02-25 ENCOUNTER — LAB ENCOUNTER (OUTPATIENT)
Dept: LAB | Facility: HOSPITAL | Age: 40
End: 2025-02-25
Attending: OBSTETRICS & GYNECOLOGY
Payer: COMMERCIAL

## 2025-02-25 DIAGNOSIS — Z97.5 BREAKTHROUGH BLEEDING ASSOCIATED WITH INTRAUTERINE DEVICE (IUD): ICD-10-CM

## 2025-02-25 DIAGNOSIS — N92.1 BREAKTHROUGH BLEEDING ASSOCIATED WITH INTRAUTERINE DEVICE (IUD): ICD-10-CM

## 2025-02-25 LAB
DEPRECATED RDW RBC AUTO: 41.3 FL (ref 35.1–46.3)
ERYTHROCYTE [DISTWIDTH] IN BLOOD BY AUTOMATED COUNT: 13.8 % (ref 11–15)
HCT VFR BLD AUTO: 34.6 %
HGB BLD-MCNC: 11.6 G/DL
MCH RBC QN AUTO: 27.7 PG (ref 26–34)
MCHC RBC AUTO-ENTMCNC: 33.5 G/DL (ref 31–37)
MCV RBC AUTO: 82.6 FL
PLATELET # BLD AUTO: 279 10(3)UL (ref 150–450)
RBC # BLD AUTO: 4.19 X10(6)UL
WBC # BLD AUTO: 6 X10(3) UL (ref 4–11)

## 2025-02-25 PROCEDURE — 36415 COLL VENOUS BLD VENIPUNCTURE: CPT

## 2025-02-25 PROCEDURE — 85027 COMPLETE CBC AUTOMATED: CPT

## 2025-03-13 ENCOUNTER — OFFICE VISIT (OUTPATIENT)
Dept: OBGYN CLINIC | Facility: CLINIC | Age: 40
End: 2025-03-13

## 2025-03-13 VITALS
DIASTOLIC BLOOD PRESSURE: 80 MMHG | BODY MASS INDEX: 22 KG/M2 | SYSTOLIC BLOOD PRESSURE: 128 MMHG | WEIGHT: 135 LBS | HEART RATE: 82 BPM

## 2025-03-13 DIAGNOSIS — Z30.431 IUD CHECK UP: Primary | ICD-10-CM

## 2025-03-13 PROCEDURE — 99213 OFFICE O/P EST LOW 20 MIN: CPT | Performed by: OBSTETRICS & GYNECOLOGY

## 2025-03-13 NOTE — PROGRESS NOTES
Subjective:   Patient ID: Rosa Paz is a 40 year old female.    HPI    History of Present Illness  The patient, with a history of prolonged bleeding associated with an intrauterine device (IUD), presents for a follow-up visit. The patient reports that the bleeding lasted for about three to four weeks, which was more like period bleeding rather than spotting. The patient also mentions experiencing bloating and breast sensitivity, which she attributes to the IUD. The patient is concerned about the prolonged bleeding and the potential for anemia, but is willing to tolerate the nuisance of the bleeding. The patient also mentions that her iron level was slightly below average, but she is currently taking a daily women's multivitamin.        History/Other:   Review of Systems   Constitutional:  Negative for appetite change, fatigue and unexpected weight change.   Eyes:  Negative for visual disturbance.   Respiratory:  Negative for cough and shortness of breath.    Cardiovascular:  Negative for chest pain, palpitations and leg swelling.   Gastrointestinal:  Negative for abdominal distention, abdominal pain, blood in stool, constipation and diarrhea.   Genitourinary:  Positive for menstrual problem. Negative for dyspareunia, dysuria, frequency, pelvic pain and urgency.   Musculoskeletal:  Negative for arthralgias and myalgias.   Skin:  Negative for rash.   Neurological:  Negative for weakness, numbness and headaches.   Psychiatric/Behavioral:  Negative for dysphoric mood. The patient is not nervous/anxious.      No current outpatient medications on file.     Allergies:Allergies[1]    Objective:   Physical Exam  Constitutional:       General: She is not in acute distress.     Appearance: Normal appearance. She is normal weight. She is not toxic-appearing.   Genitourinary:     Comments: EG, vagina and cervix w/o lesions. Strings seen at 2.5 cm length. Uterus NSSC and no adnexal mass / tenderness.       Neurological:       Mental Status: She is alert.         Assessment & Plan:   1. IUD check up        No orders of the defined types were placed in this encounter.      Meds This Visit:  Requested Prescriptions      No prescriptions requested or ordered in this encounter       Imaging & Referrals:  None    Assessment & Plan   Anemia is a concern if the bleeding pattern persists, and surgical intervention should be a last resort. The IUD has been in place for six weeks, and it is recommended to allow three to six months to assess its effectiveness.  - Monitor bleeding pattern and contact if bleeding persists for more than three weeks.  - Check blood count if bleeding persists for more than three weeks again.  - Consider removal of IUD if prolonged bleeding continues and becomes intolerable.  - Reassess in three to six months to determine if the IUD should be removed.  - Continue taking daily women's multivitamin.           [1] No Known Allergies

## 2025-04-25 ENCOUNTER — HOSPITAL ENCOUNTER (OUTPATIENT)
Dept: MAMMOGRAPHY | Facility: HOSPITAL | Age: 40
Discharge: HOME | End: 2025-04-25
Payer: COMMERCIAL

## 2025-04-25 DIAGNOSIS — Z12.31 SCREENING MAMMOGRAM FOR BREAST CANCER: ICD-10-CM

## 2025-04-25 PROCEDURE — 77063 BREAST TOMOSYNTHESIS BI: CPT

## 2025-04-25 PROCEDURE — 77067 SCR MAMMO BI INCL CAD: CPT

## 2025-04-29 ENCOUNTER — TELEPHONE (OUTPATIENT)
Dept: OBGYN CLINIC | Facility: CLINIC | Age: 40
End: 2025-04-29

## 2025-04-29 NOTE — TELEPHONE ENCOUNTER
Rosa completed Mammogram on 4/25/2025, calling for results. She saw PRERNA Alarcon after seeing Destiny Hunter regarding dense breast tissue. PRERNA Whitley ordered complete breast ultrasound. Rosa has that scheduled for this Friday, but states she was told if Mammogram was not read, they would cancel ultrasound. We addressed that Radiology is about 7-10 days for a read at this time. She is very anxious and would like to keep appointment on Friday. Informed RN will attempt to call Radiology to see if her Mammogram can be read. She is very appreciative.     Spoke to Estelle in Mammogram, she spoke with Radiologist. They indicated they will read it before the end of today. Rosa called and informed.

## 2025-05-08 ENCOUNTER — HOSPITAL ENCOUNTER (OUTPATIENT)
Dept: MAMMOGRAPHY | Facility: HOSPITAL | Age: 40
Discharge: HOME | End: 2025-05-08
Payer: COMMERCIAL

## 2025-05-08 ENCOUNTER — HOSPITAL ENCOUNTER (OUTPATIENT)
Dept: ULTRASOUND IMAGING | Facility: HOSPITAL | Age: 40
Discharge: HOME | End: 2025-05-08
Payer: COMMERCIAL

## 2025-05-08 DIAGNOSIS — R92.8 ABNORMAL MAMMOGRAM: Primary | ICD-10-CM

## 2025-05-08 DIAGNOSIS — R92.343 EXTREMELY DENSE TISSUE OF BOTH BREASTS ON MAMMOGRAPHY: Primary | ICD-10-CM

## 2025-05-08 DIAGNOSIS — R92.8 ABNORMAL MAMMOGRAM: ICD-10-CM

## 2025-05-08 DIAGNOSIS — R92.343 EXTREMELY DENSE TISSUE OF BOTH BREASTS ON MAMMOGRAPHY: ICD-10-CM

## 2025-05-08 PROCEDURE — 77066 DX MAMMO INCL CAD BI: CPT

## 2025-05-08 PROCEDURE — 76642 ULTRASOUND BREAST LIMITED: CPT

## 2025-05-08 PROCEDURE — 77062 BREAST TOMOSYNTHESIS BI: CPT

## 2025-06-23 ENCOUNTER — HOSPITAL ENCOUNTER (OUTPATIENT)
Dept: ULTRASOUND IMAGING | Facility: HOSPITAL | Age: 40
Discharge: HOME | End: 2025-06-23
Payer: COMMERCIAL

## 2025-06-23 DIAGNOSIS — R92.343 EXTREMELY DENSE TISSUE OF BOTH BREASTS ON MAMMOGRAPHY: ICD-10-CM

## 2025-06-23 PROCEDURE — 76641 ULTRASOUND BREAST COMPLETE: CPT

## 2025-07-18 ENCOUNTER — TELEPHONE (OUTPATIENT)
Dept: OBGYN CLINIC | Facility: CLINIC | Age: 40
End: 2025-07-18

## 2025-07-18 NOTE — TELEPHONE ENCOUNTER
Last annual- 12/12/24 with Dr. Whitfield   Yoils IUD insert- 1/31/25 with Dr. Whitfield   Bilateral diag mammogram and bilateral breast US - 5/8/25  Whole breast ultrasound - 6/23/25 (no suspicious findings)  F/up Mammo scheduled on 11/11/25    Patient calling today to report having constant breast sensitivity (last for more than a week) and random piercing shooting breast pain (last a few seconds). Patient states the shooting pain happens randomly weather or not she is moving or at rest. She is able to tolerate the discomfort, and continue to perform ADLs, but states it is noticeable.    Patient denies any breast lumps or bumps.  She reports she has a \"level 4 density\", asymmetry, and 2 masses. Patient has f/up mammogram scheduled in November.    She also reports she is still getting regular random spotting throughout the month. For example- last week she had spotting for 2 days and she also spotted yesterday. Patient asking if this may be due to the IUD.    No available appointments to offer next week. Patient informed Dr. Whitfield will be out of the office. She verbalized understanding and states she is willing to see him when he gets back if needed.    Message to Dr. Whitfield to please review and advise. Thank you

## 2025-07-18 NOTE — TELEPHONE ENCOUNTER
Patient states she had her IUD placed end of January and states she has noticed her breast have been sensitive and has been experiencing shooting pains. Please advise

## 2025-07-18 NOTE — TELEPHONE ENCOUNTER
RN spoke with Dr. Whitfield in office.  Per Dr. Whitfield, Rosa doesn't need to come in for an exam since she already had recent test and has f/up mammogram scheduled. She can try Ibuprofen and avoid avoid caffeine. The spotting is due to the IUD.     RN spoke with Rosa and notified of Dr. Whitfield's recommendations. She states understanding.

## 2025-08-28 ENCOUNTER — OFFICE VISIT (OUTPATIENT)
Dept: FAMILY MEDICINE CLINIC | Facility: CLINIC | Age: 40
End: 2025-08-28

## 2025-08-28 VITALS
TEMPERATURE: 97 F | OXYGEN SATURATION: 99 % | HEART RATE: 74 BPM | BODY MASS INDEX: 21 KG/M2 | WEIGHT: 131 LBS | DIASTOLIC BLOOD PRESSURE: 72 MMHG | RESPIRATION RATE: 18 BRPM | SYSTOLIC BLOOD PRESSURE: 130 MMHG

## 2025-08-28 DIAGNOSIS — Z00.00 ROUTINE PHYSICAL EXAMINATION: Primary | ICD-10-CM

## 2025-08-28 DIAGNOSIS — Z12.11 COLON CANCER SCREENING: ICD-10-CM

## 2025-08-28 PROCEDURE — 99396 PREV VISIT EST AGE 40-64: CPT | Performed by: FAMILY MEDICINE

## (undated) DEVICE — KIT HYSTEROSCOPIC PROC INCL INFLO OUTFLO TB

## (undated) DEVICE — GLOVE SUR 8 SENSICARE PI MIC PIP CRM PWD F

## (undated) DEVICE — 1016 S-DRAPE IRRIG POUCH 10/BOX: Brand: STERI-DRAPE™

## (undated) DEVICE — CANISTER SUCT 3000CC HI FLO DISP FOR FLD MGMT

## (undated) DEVICE — SOLUTION IRRIG 3000ML 0.9% NACL FLX CONT

## (undated) DEVICE — HYSTEROSCOPY: Brand: MEDLINE INDUSTRIES, INC.

## (undated) DEVICE — GLOVE SUR 8.5 SENSICARE PI MIC PIP CRM PWD F

## (undated) DEVICE — SOLUTION IRRIG 1000ML 0.9% NACL USP BTL

## (undated) NOTE — LETTER
18          RE:  Cecilia Suazo  :  1985      To Whom It May Concern:    Cecilia Suazo  is currently under our care for pregnancy. She is due on 18. If you have any additional concerns, do not hesitate to contact our office.     Sincerely,

## (undated) NOTE — LETTER
10/28/19          RE:  [facility]  :  1985      To Whom It May Concern:    Jaquelin Benitez  is currently under our care for pregnancy. It is permissible at her gestational age for dental work to be performed.   However, if x-rays ar

## (undated) NOTE — LETTER
[address] [address], [place]  Authorization for Surgical Operation and Procedure                                                                                           I hereby authorize DR. Garima Santiago, my physician and his/her assistants (if applicable), which may include medical students, residents, and/or fellows, to perform the following surgical operation/ procedure and administer such anesthesia as may be determined necessary by my physician: ULTRASOUND GUIDED BILATERAL BREAST BIOPSY WITH CLIP PLACEMENT TO [facility] on [address] [place] [address]   2. I recognize that during the surgical operation/procedure, unforeseen conditions may necessitate additional or different procedures than those listed above. I, therefore, further authorize and request that the above-named surgeon, assistants, or designees perform such procedures as are, in their judgment, necessary and desirable. 3.   My surgeon/physician has discussed prior to my surgery the potential benefits, risks and side effects of this procedure; the likelihood of achieving goals; and potential problems that might occur during recuperation. They also discussed reasonable alternatives to the procedure, including risks, benefits, and side effects related to the alternatives and risks related to not receiving this procedure. I have had all my questions answered and I acknowledge that no guarantee has been made as to the result that may be obtained. 4.   Should the need arise during my operation/procedure, which includes change of level of care prior to discharge, I also consent to the administration of blood and/or blood products. Further, I understand that despite careful testing and screening of blood or blood products by collecting agencies, I may still be subject to ill effects as a result of receiving a blood transfusion and/or blood products.   The following are some, but not all, of the potential risks that can occur: fever and allergic reactions, hemolytic reactions, transmission of diseases such as Hepatitis, AIDS and Cytomegalovirus (CMV) and fluid overload. In the event that I wish to have an autologous transfusion of my own blood, or a directed donor transfusion, I will discuss this with my physician. Check only if Refusing Blood or Blood Products  I understand refusal of blood or blood products as deemed necessary by my physician may have serious consequences to my condition to include possible death. I hereby assume responsibility for my refusal and release the hospital, its personnel, and my physicians from any responsibility for the consequences of my refusal.    o  Refuse   5. I authorize the use of any specimen, organs, tissues, body parts or foreign objects that may be removed from my body during the operation/procedure for diagnosis, research or teaching purposes and their subsequent disposal by hospital authorities. I also authorize the release of specimen test results and/or written reports to my treating physician on the hospital medical staff or other referring or consulting physicians involved in my care, at the discretion of the Pathologist or my treating physician. 6.   I consent to the photographing or videotaping of the operations or procedures to be performed, including appropriate portions of my body for medical, scientific, or educational purposes, provided my identity is not revealed by the pictures or by descriptive texts accompanying them. If the procedure has been photographed/videotaped, the surgeon will obtain the original picture, image, videotape or CD. The hospital will not be responsible for storage, release or maintenance of the picture, image, tape or CD.    7.   I consent to the presence of a  or observers in the operating room as deemed necessary by my physician or their designees.     8.   I recognize that in the event my procedure results in extended X-Ray/fluoroscopy time, I may develop a skin reaction. 9. If I have a Do Not Attempt Resuscitation (DNAR) order in place, that status will be suspended while in the operating room, procedural suite, and during the recovery period unless otherwise explicitly stated by me (or a person authorized to consent on my behalf). The surgeon or my attending physician will determine when the applicable recovery period ends for purposes of reinstating the DNAR order. 10. Patients having a sterilization procedure: I understand that if the procedure is successful the results will be permanent and it will therefore be impossible for me to inseminate, conceive, or bear children. I also understand that the procedure is intended to result in sterility, although the result has not been guaranteed. 11. I acknowledge that my physician has explained sedation/analgesia administration to me including the risk and benefits I consent to the administration of sedation/analgesia as may be necessary or desirable in the judgment of my physician. I CERTIFY THAT I HAVE READ AND FULLY UNDERSTAND THE ABOVE CONSENT TO OPERATION and/or OTHER PROCEDURE.     _________________________________________ _________________________________     ___________________________________  Signature of Patient     Signature of Responsible Person                   Printed Name of Responsible Person                              _________________________________________ ______________________________        ___________________________________  Signature of Witness         Date  Time         Relationship to Patient    STATEMENT OF PHYSICIAN My signature below affirms that prior to the time of the procedure; I have explained to the patient and/or his/her legal representative, the risks and benefits involved in the proposed treatment and any reasonable alternative to the proposed treatment.  I have also explained the risks and benefits involved in refusal of the proposed treatment and alternatives to the proposed treatment and have answered the patient's questions.  If I have a significant financial interest in a co-management agreement or a significant financial interest in any product or implant, or other significant relationship used in this procedure/surgery, I have disclosed this and had a discussion with my patient.     _______________________________________________________________ _____________________________  (Signature of Physician)                                                                                         (Date)                                   (Time)  Patient Name: Marilynn Joseph    : 1985   Printed: 2023      Medical Record #: [id number]                                              Page 1 of 1

## (undated) NOTE — LETTER
7/2/2019                Dee Paz        [address] [address]        [id number] [place] [id number]               To Whom It May Concern:    Carleen Junior is under my medical care for pregnancy. She is in the first trimester of pregnancy.  She is at [facility]

## (undated) NOTE — LETTER
October 28, 2019      Harrison Paz  [address] [place] [id number]      Dear Sana Agosto:    {DMG_NO SHOW [id number]} scheduled with [employer] Girish Whitfield DO, which you were unable to keep, or cancelled too late for us to reschedule.  We understand t

## (undated) NOTE — LETTER
July 22, 2021         Ruchi Davenport MD  [id number]      Patient: Constance Rehman   YOB: 1985   Date of Visit: 7/22/2021       Dear Dr. Mimi Gonzales MD,    I saw your patient, Constance Rehman, on 7/22/2021.  En

## (undated) NOTE — LETTER
11/15/18      Patient: Edwar June  : 1985 Visit date: 11/15/2018    Dear Americo Bailon,      I examined your patient in consultation today. She has an asymptomatic ganglion of the left ring finger.   She has no pain and she has norm

## (undated) NOTE — LETTER
AUTHORIZATION FOR SURGICAL OPERATION OR OTHER PROCEDURE    1. I hereby authorize Dr. Mario Flores, and [address] [place] [address] staff assigned to my case to perform the following operation and/or procedure at the [address] [place] [address]:    _______________________________________________________________________________________________      ___________________________I and D cyst on Right ankle ____________________________________________________________________    2. My physician has explained the nature and purpose of the operation or other procedure, possible alternative methods of treatment, the risks involved, and the possibility of complication to me. I acknowledge that no guarantee has been made as to the result that may be obtained. 3.  I recognize that, during the course of this operation, or other procedure, unforseen conditions may necessitate additional or different procedure than those listed above. I, therefore, further authorize and request that the above named physician, his/her physician assistants or designees perform such procedures as are, in his/her professional opinion, necessary and desirable. 4.  Any tissue or organs removed in the operation or other procedure may be disposed of by and at the discretion of the [address] [place] [address] and [facility]. 5.  I understand that in the event of a medical emergency, I will be transported by local paramedics to [facility] or other hospital emergency department. 6.  I certify that I have read and fully understand the above consent to operation and/or other procedure. 7.  I acknowledge that my physician has explained sedation/analgesia administration to me including the risks and benefits. I consent to the administration of sedation/analgesia as may be necessary or desirable in the judgement of my physician.     Witness signature: ___________________________________________________ Date:  ______/______/_____                    Time: ________ A. M.  P.M. Patient Name:  ______________________________________________________  (please print)      Patient signature:  ___________________________________________________             Relationship to Patient:           []  Parent    Responsible person                          []  Spouse  In case of minor or                    [] Other  _____________   Incompetent name:  __________________________________________________                               (please print)      _____________      Responsible person  In case of minor or  Incompetent signature:  _______________________________________________    Statement of Physician  My signature below affirms that prior to the time of the procedure, I have explained to the patient and/or his/her guardian, the risks and benefits involved in the proposed treatment and any reasonable alternative to the proposed treatment. I have also explained the risks and benefits involved in the refusal of the proposed treatment and have answered the patient's questions.                         Date:  ______/______/_______  Provider                      Signature:  __________________________________________________________       Time:  ___________ A.M    P.M.

## (undated) NOTE — LETTER
MINOR CASE LETTER      5/10/2024        Dear Rosa,    Your are having a Operative hysteroscopy with possible TruClear endometrial sampling on Tuesday,06/18/2024 at 1:30pm at [facility].    Do not eat or drink anything (including water) after midnight the night before surgery.    If your procedure is scheduled later in the day, then nothing by mouth for 6 hours before arrival to the hospital.    You are to call this office if you have any cold or flu symptoms 2 days before your scheduled surgery.    Please avoid ALL aspirin and herbal supplements 7 days before surgery.  Avoid Ibuprofen, Motrin, Aleve, or Naprosyn for 3 days before surgery.    Please avoid ALL Cannabis use 24 hours prior to surgery.    You cannot wear hair pins,wigs,artificial nail or metallic nails/ nail polish for surgery.    You will be contacted by PreAdmission Testing (PAT) usually within the week before surgery.  They will take a short medical history and let you know if any preoperative testing is needed. If you have any questions for preadmission testing please feel free to contact them by calling [phone].     Per your insurance no prior authorization is needed for surgery.    Please make arrangements for someone to drive you home after your surgery.    Call our office now to schedule your post-operative appointment for 2 weeks after surgery.    Please feel free to contact our office at [phone] if you have any questions regarding these instructions or your procedure.      Sincerely,          Damaso Whitfield, DO  [facility]  [address] [place] [id number] [phone]

## (undated) NOTE — LETTER
[facility] [address] , [place]   Date:   6/12/2023     Name:   Pawel aHrdy    YOB: 1985   MRN:   [id number]       WHERE IS YOUR PAIN NOW? Alisha the areas on your body where you feel the described sensations. Use the appropriate symbol. Tereasa Flo the areas of radiation. Include all affected areas. Just to complete the picture, please draw in the face. ACHE:  ^ ^ ^   NUMBNESS:  0000   PINS & NEEDLES:  = = = =                              ^ ^ ^                       0000              = = = =                                    ^ ^ ^                       0000            = = = =      BURNING:  XXXX   STABBING: ////                  XXXX                ////                         XXXX          ////     Please alisha the line below indicating your degree of pain right now  with 0 being no pain 10 being the worst pain possible.                                          0             1             2              3             4              5              6              7             8             9             10         Patient Signature:

## (undated) NOTE — LETTER
10/28/19          RE:  [facility]  :  1985      To Whom It May Concern:    Dominique Scheuermann  is currently under our care for pregnancy. It is permissible at her gestational age for dental work to be performed.   However, if x-rays are need

## (undated) NOTE — LETTER
AUTHORIZATION FOR SURGICAL OPERATION OR OTHER PROCEDURE    1. I hereby authorize Dr. Jimmy Cazares and [facility] [employer] staff assigned to my case to perform the following operation and/or procedure at the [address]      _______________________________________________________________________________________________    2. My physician has explained the nature and purpose of the operation or other procedure, possible alternative methods of treatment, the risks involved, and the possibility of complication to me. I acknowledge that no guarantee has been made as to the result that may be obtained. 3.  I recognize that, during the course of this operation, or other procedure, unforseen conditions may necessitate additional or different procedure than those listed above. I, therefore, further authorize and request that the above named physician, his/her physician assistants or designees perform such procedures as are, in his/her professional opinion, necessary and desirable. 4.  Any tissue or organs removed in the operation or other procedure may be disposed of by and at the discretion of the [place] [facility] and [employer] [facility]. 5.  I understand that in the event of a medical emergency, I will be transported by local paramedics to [facility] or other hospital emergency department. 6.  I certify that I have read and fully understand the above consent to operation and/or other procedure. 7.  I acknowledge that my physician has explained sedation/analgesia administration to me including the risks and benefits. I consent to the administration of sedation/analgesia as may be necessary or desirable in the judgement of my physician. Witness signature: ___________________________________________________ Date:  ______/______/_____                    Time:  ________ A. M.  P.M.        Patient Name: ______________________________________________________  (please print)      Patient signature:  ___________________________________________________             Relationship to Patient:           []  Parent    Responsible person                          []  Spouse  In case of minor or                    [] Other  _____________   Incompetent name:  __________________________________________________                               (please print)      _____________      Responsible person  In case of minor or  Incompetent signature:  _______________________________________________    Statement of Physician  My signature below affirms that prior to the time of the procedure, I have explained to the patient and/or his/her guardian, the risks and benefits involved in the proposed treatment and any reasonable alternative to the proposed treatment. I have also explained the risks and benefits involved in the refusal of the proposed treatment and have answered the patient's questions.                         Date:  ______/______/_______  Provider                      Signature:  __________________________________________________________       Time:  ___________ A.M    P.M.

## (undated) NOTE — LETTER
AUTHORIZATION FOR SURGICAL OPERATION OR OTHER PROCEDURE    1. I hereby authorize Dr. OSUNA, and [facility] staff assigned to my case to perform the following operation and/or procedure at the [facility] site:    _______________________________________________________________________________________________    IUD INSERTION   _______________________________________________________________________________________________    2.  My physician has explained the nature and purpose of the operation or other procedure, possible alternative methods of treatment, the risks involved, and the possibility of complication to me.  I acknowledge that no guarantee has been made as to the result that may be obtained.  3.  I recognize that, during the course of this operation, or other procedure, unforseen conditions may necessitate additional or different procedure than those listed above.  I, therefore, further authorize and request that the above named physician, his/her physician assistants or designees perform such procedures as are, in his/her professional opinion, necessary and desirable.  4.  Any tissue or organs removed in the operation or other procedure may be disposed of by and at the discretion of the [facility] and [facility].  5.  I understand that in the event of a medical emergency, I will be transported by local paramedics to [facility] or other hospital emergency department.  6.  I certify that I have read and fully understand the above consent to operation and/or other procedure.    7.  I acknowledge that my physician has explained sedation/analgesia administration to me including the risks and benefits.  I consent to the administration of sedation/analgesia as may be necessary or desirable in the judgement of my physician.    Witness signature: ___________________________________________________ Date:  ______/______/_____                    Time:   ________ A.M.  P.M.       Patient Name:  ______________________________________________________  (please print)      Patient signature:  ___________________________________________________             Relationship to Patient:           []  Parent    Responsible person                          []  Spouse  In case of minor or                    [] Other  _____________   Incompetent name:  __________________________________________________                               (please print)      _____________      Responsible person  In case of minor or  Incompetent signature:  _______________________________________________    Statement of Physician  My signature below affirms that prior to the time of the procedure, I have explained to the patient and/or his/her guardian, the risks and benefits involved in the proposed treatment and any reasonable alternative to the proposed treatment.  I have also explained the risks and benefits involved in the refusal of the proposed treatment and have answered the patient's questions.                        Date:  ______/______/_______  Provider                      Signature:  __________________________________________________________       Time:  ___________ A.M    P.M.

## (undated) NOTE — LETTER
No referring provider defined for this encounter. 02/06/18        Patient: Dariela Ibrahim   YOB: 1985   Date of Visit: 2/6/2018       Dear  Dr. Sindhu Bowen, DO,      Thank you for referring Dariela Ibrahim to my practice.   Please find my

## (undated) NOTE — LETTER
AUTHORIZATION FOR SURGICAL OPERATION OR OTHER PROCEDURE    1.  I hereby authorize Dr. Quoc Lozano, and [facility] staff assigned to my case to perform the following operation and/or procedure at the [facility]:    Drainage of cyst on Right ankle Time:  ________ A. M.  P.M.        Patient Name:  ______________________________________________________  (please print)      Patient signature:  ___________________________________________________             Relationship to Patient:           []  Parent

## (undated) NOTE — LETTER
19          RE:  Peg Orf  :  1985      To Whom It May Concern:    Peg Orf  is currently under our care for pregnancy. It is permissible at her gestational age for dental work to be performed.   However, if x-rays are needed, please

## (undated) NOTE — LETTER
VACCINE ADMINISTRATION RECORD  PARENT / GUARDIAN APPROVAL  Date: 2019  Vaccine administered to: Renadr Paz     : 1985    MRN: [id number]    A copy of the appropriate Centers for Disease Control and Prevention Vaccine Information stateme